# Patient Record
Sex: MALE | Race: WHITE | NOT HISPANIC OR LATINO | Employment: OTHER | URBAN - METROPOLITAN AREA
[De-identification: names, ages, dates, MRNs, and addresses within clinical notes are randomized per-mention and may not be internally consistent; named-entity substitution may affect disease eponyms.]

---

## 2023-04-06 ENCOUNTER — NURSING HOME VISIT (OUTPATIENT)
Dept: POST ACUTE CARE | Facility: EXTERNAL LOCATION | Age: 78
End: 2023-04-06
Payer: MEDICARE

## 2023-04-06 DIAGNOSIS — E11.59 HYPERTENSION ASSOCIATED WITH DIABETES (MULTI): ICD-10-CM

## 2023-04-06 DIAGNOSIS — I63.9 CEREBROVASCULAR ACCIDENT (CVA), UNSPECIFIED MECHANISM (MULTI): ICD-10-CM

## 2023-04-06 DIAGNOSIS — I15.2 HYPERTENSION ASSOCIATED WITH DIABETES (MULTI): ICD-10-CM

## 2023-04-06 DIAGNOSIS — D61.818 PANCYTOPENIA (MULTI): ICD-10-CM

## 2023-04-06 DIAGNOSIS — M86.9 OSTEOMYELITIS, UNSPECIFIED SITE, UNSPECIFIED TYPE (MULTI): Primary | ICD-10-CM

## 2023-04-06 DIAGNOSIS — E11.51 TYPE 2 DIABETES MELLITUS WITH DIABETIC PERIPHERAL ANGIOPATHY WITHOUT GANGRENE, UNSPECIFIED WHETHER LONG TERM INSULIN USE (MULTI): ICD-10-CM

## 2023-04-06 PROCEDURE — 99306 1ST NF CARE HIGH MDM 50: CPT | Performed by: EMERGENCY MEDICINE

## 2023-04-06 PROCEDURE — 99497 ADVNCD CARE PLAN 30 MIN: CPT | Performed by: EMERGENCY MEDICINE

## 2023-04-06 NOTE — LETTER
Patient: Vahid Monahan  : 1945    Encounter Date: 2023    Vahid Monahan   77 y.o.  male  @location@            Assessment and Plan:  History and physical    All Diagnoses This Visit Abnormal CT scan Abnormal CT scan Back pain Bilateral sacroiliitis Carpal tunnel syndrome of left wrist Diabetes mellitus Hypertension Lower back pain Lower back pain Osteomyelitis Stroke Unilateral primary osteoarthritis, left hip   Discharge Medications   New acetaminophen-oxycodone (acetaminophen-oxycodone 325 mg-5 mg oral tablet = Percocet)2 Tabs Oral EVERY SIX HOURS for 7 Days. Refills: 0. calcium carbonate (Tums 500 mg oral tablet, chewable)1 Tabs Oral EVERY FOUR HOURS as needed Indigestion. DAPTOmycin (DAPTOmycin 500 mg intravenous injection)500 Milligram IV EVERY TWENTYFOUR HOURS for 4 Weeks. CBC CMP weekly. Refills: 0. docusate-senna (Senokot S (docu 50mg/senna 8.6mg))1 Tabs Oral TWICE A DAY. gabapentin (gabapentin 100 mg oral capsule)1 Capsules Oral THREE TIMES A DAY. insulin lispro (Humalog)Mild Scale Subcutaneous WITH MEALS AND AT BEDTIME. << Sliding Scale Comments >>  0 Units; 151-200 2 Units; 201-250 4 Units; 251-300 6 Units; 301-350 8 Units; 351-400 10 Units; Greater than 400 12 Units << Sliding Scale Comments >>. magnesium hydroxide (Milk of Magnesia Conc 10ml =30ml MOM)10 Milliliter Oral DAILY as needed Constipation. oxyCODONE (oxyCODONE 5 mg oral tablet (IR))1 Tabs Oral EVERY FOUR HOURS as needed Breakthrough Pain for 7 Days. Refills: 0. piperacillin-tazobactam (Zosyn 3 g-0.375 g/50 mL intravenous solution)3.375 Gram IV EVERY EIGHT HOURS for 4 Weeks. Please call if there is any diarrhea. Refills: 0. polyethylene glycol 3350 (MiraLax)17 Gram Oral DAILY as needed Constipation.   Unchanged aspirin (Margarette Aspirin 325 mg oral tablet)1 Tabs Oral DAILY WITH BREAKFAST. atorvastatin (atorvastatin 80 mg oral tablet)1 Tabs Oral DAILY. chondroitin-glucosamine (Chondroitin-Glucosamine 600 mg-750 mg oral  tablet)1 Tabs Oral TWICE A DAY. glimepiride (glimepiride 2 mg oral tablet)1 Tabs Oral DAILY. metFORMIN (metFORMIN 1000 mg oral tablet)1 Tabs Oral TWICE A DAY WITH MEALS. multivitamin with minerals1 Tabs Oral DAILY. tamsulosin (Flomax 0.4 mg oral capsule)1 Capsules Oral DAILY.   Discontinued ibuprofen = Motrin, Advil (ibuprofen 800 mg oral tablet)1 Tabs Oral EVERY EIGHT HOURS. naproxen (Aleve 220 mg oral capsule)1 Capsules Oral EVERY TWELVE HOURS as needed as needed for pain. Hospital Course     77 year old male was admitted with right sided lumbar pain and was found to have chronic osteomyelitis on imaging. He was placed on IV antibiotics and PICC line was placed for long term IV antibiotics. He was recently evaluated for pancytopenia by hematologist and suspected to have MDS. He will need to follow up with hematologist. He will be discharged to SNF.    I spent greater than 16 minutes discussing advanced care planning including the explanation and discussion of advanced directives. If patient does not have current up to date documents, examples and information provided on how to create both living will and power of . Patient was encouraged to work on completing these documents.  Information and advise was also provided on DO NOT RESUSCITATE and patient encouraged to consider this  Patient is not sure about DNR at this time      Source of history: Nurse, Medical personnel, Medical record, Patient.  History limitation: None.    HPI:  History and physical    Patient is unable to give any detailed history and therefore history is obtained from the chart  No acute complaints voiced by the patient or acute concerns raised by nursing  No current outpatient medications on file.     No current facility-administered medications for this visit.     Chief Complaint Low back pain History of Present Illness 77-year-old male with diabetes, prior CVA, chronic low back pain presents with worsening sharp, stabbing low back  pain that radiates down to his right knee. He states that in January he had mild low back pain however after receiving a pain injection by pain management at the end of the month, he developed significant worsening of the low back pain. It has since progressed to the point where he is now unable to ambulate. He is in no pain while laying flat but gets discomfort once he flexes his torso forward. He denies any numbness, tingling, saddle anesthesia, bowel or bladder incontinence.    He quit smoking 50 years ago. He denies any alcohol or illicit drug use. He has been seeing hematology for pancytopenia for which there is no clear cause at this time.    He does admit to a large weight loss since having a CVA in February 2022.    Problem List/Past Medical History Ongoing Arthritis Bilateral sacroiliitis Carpal tunnel syndrome of left wrist CHF - Congestive heart failure Degenerative disc disease, lumbar Diabetes mellitus Greater trochanteric bursitis of right hip Hernia Hyperlipidemia Hypertension Long term prescription opiate use Lumbar postlaminectomy syndrome Lumbar stenosis Lumbosacral spondylosis without myelopathy Pancytopenia Radicular low back pain Stroke Unilateral primary osteoarthritis, left hip Weight loss of more than 10% body weight Historical Low back pain Osteoarthritis of left hip Umbilical hernia Procedure/Surgical History Sacroiliac Joint Injection.: 01/04/23  Lumbar Medial Nerve Branch Radiofrequency Ablation (RFA).: 04/09/19  Lumbar Medial Nerve Branch Radiofrequency Ablation (RFA).: 04/02/19  bilateral lumbar medial nerve branch block: 02/05/19  Lumbar Medial Nerve Branch Block.: 01/22/19  Carpal Tunnel Release.: 01/17/18  Lumbar Laminectomy.: 09/25/17  total left hip: 06/27/16  Umbilical hernia repair Medications Inpatient acetaminophen-oxycodone 325 mg-5 mg oral tablet = Percocet, 1 tabs, ORAL, [id number], PRN Ambien, 2.5 mg= 1 EA, ORAL, [id number], PRN aspirin, 325 mg= 1 tabs, ORAL, DAILY  WITH BREAKFAST atorvastatin, 80 mg= 2 tabs, ORAL, DAILY Dextrose 50% injection, 12.5 g= 25 mL, IV Push, PRN, PRN Dextrose 50% injection, 25 g= 50 mL, IV Push, PRN, PRN Flomax, 0.4 mg= 1 caps, ORAL, DAILY gabapentin, 100 mg= 1 caps, ORAL, TID glucagon, 1 mg, IM, PRN, PRN glucose 15 g/42 mL oral gel, 15 g= 1 packets, ORAL, PRN, PRN glucose 15 g/42 mL oral gel, 30 g= 2 packets, ORAL, PRN, PRN Humalog coverage, Mild Scale, Subcutaneous, QIDWMHS ketorolac = Toradol, 15 mg= 1 mL, IV Push, [id number] Lovenox, 40 mg= 0.4 mL, Subcutaneous, QHS Theragran-M (multivitamin), 1 tabs, ORAL, DAILY Tylenol, 650 mg= 2 tabs, ORAL, [id number], PRN Home Aleve 220 mg oral capsule, 220 mg= 1 caps, ORAL, [id number], PRN atorvastatin 80 mg oral tablet, 80 mg= 1 tabs, ORAL, DAILY Margarette Aspirin 325 mg oral tablet, 325 mg= 1 tabs, ORAL, DAILY WITH BREAKFAST Chondroitin-Glucosamine 600 mg-750 mg oral tablet, 1 tabs, ORAL, BID Flomax 0.4 mg oral capsule, 0.4 mg= 1 caps, ORAL, DAILY glimepiride 2 mg oral tablet, 2 mg= 1 tabs, ORAL, DAILY ibuprofen 800 mg oral tablet, 800 mg= 1 tabs, ORAL, [id number] metFORMIN 1000 mg oral tablet, 1000 mg= 1 tabs, ORAL, BIDWM multivitamin with minerals, 1 tabs, ORAL, DAILY Allergies No Known Allergies No Known Medication Allergies Social History   Alcohol - Denies Alcohol Use   Domestic Concerns   No, Pt's responsibilities: Driving, Housework, Yard work. Hospital finance concerns: No. No, Family/Friends available to help: Yes.   Home/Environment   Lives with Alone. Living situation: Home/Independent. Home equipment: Walker/Cane. Home monitoring equipment: None. Special/Community resources: None. Mobility prior to admit: Independent. Home Barriers: Internal Stairs. Will patient require additional/new services upon discharge? Yes.   Nutrition/Health   Regular, Diabetic, Unintentional weight change: No.   Substance Abuse - Denies Substance Abuse   Tobacco   Former smoker Family History Asbestosis....: Father. DVT (deep venous  thrombosis)....: Son. High blood pressure....: Mother and Father. Hyperlipidemia: Son. Lung cancer..: Mother. Pneumonia: Father.      No visits with results within 2 Month(s) from this visit.   Latest known visit with results is:   No results found for any previous visit.       Physical Exam:  Vital signs as per nursing/MA documentation were reviewed  General appearance: Alert and in no acute distress  HEENT: Normal Inspection  Neck - Normal Inspection  Respiratory : No respiratory distress. Lungs are clear   Cardiovascular: heart rate normal. No gallop  Back - normal inspection  Skin inspection:Warm  Musculoskeletal : No deformities  Neuro : Limited exam. Baseline    ROS: Review of symptoms is negative except for what is mentioned in HPI    No results found.    No family history on file.    Social History     Socioeconomic History   • Marital status:      Spouse name: Not on file   • Number of children: Not on file   • Years of education: Not on file   • Highest education level: Not on file   Occupational History   • Not on file   Tobacco Use   • Smoking status: Not on file   • Smokeless tobacco: Not on file   Substance and Sexual Activity   • Alcohol use: Not on file   • Drug use: Not on file   • Sexual activity: Not on file   Other Topics Concern   • Not on file   Social History Narrative   • Not on file     Social Determinants of Health     Financial Resource Strain: Not on file   Food Insecurity: Not on file   Transportation Needs: Not on file   Physical Activity: Not on file   Stress: Not on file   Social Connections: Not on file   Intimate Partner Violence: Not on file   Housing Stability: Not on file       No past medical history on file.    No past surgical history on file.      Charting was completed using voice recognition technology and may include unintended errors.    Discussed with patient/family, RN, and NP.      Electronically Signed By: Diogo Hopper MD   4/6/23  2:15 PM

## 2023-04-06 NOTE — PROGRESS NOTES
Vahid Monahan   77 y.o.  male  @location@            Assessment and Plan:  History and physical    All Diagnoses This Visit Abnormal CT scan Abnormal CT scan Back pain Bilateral sacroiliitis Carpal tunnel syndrome of left wrist Diabetes mellitus Hypertension Lower back pain Lower back pain Osteomyelitis Stroke Unilateral primary osteoarthritis, left hip   Discharge Medications   New acetaminophen-oxycodone (acetaminophen-oxycodone 325 mg-5 mg oral tablet = Percocet)2 Tabs Oral EVERY SIX HOURS for 7 Days. Refills: 0. calcium carbonate (Tums 500 mg oral tablet, chewable)1 Tabs Oral EVERY FOUR HOURS as needed Indigestion. DAPTOmycin (DAPTOmycin 500 mg intravenous injection)500 Milligram IV EVERY TWENTYFOUR HOURS for 4 Weeks. CBC CMP weekly. Refills: 0. docusate-senna (Senokot S (docu 50mg/senna 8.6mg))1 Tabs Oral TWICE A DAY. gabapentin (gabapentin 100 mg oral capsule)1 Capsules Oral THREE TIMES A DAY. insulin lispro (Humalog)Mild Scale Subcutaneous WITH MEALS AND AT BEDTIME. << Sliding Scale Comments >>  0 Units; 151-200 2 Units; 201-250 4 Units; 251-300 6 Units; 301-350 8 Units; 351-400 10 Units; Greater than 400 12 Units << Sliding Scale Comments >>. magnesium hydroxide (Milk of Magnesia Conc 10ml =30ml MOM)10 Milliliter Oral DAILY as needed Constipation. oxyCODONE (oxyCODONE 5 mg oral tablet (IR))1 Tabs Oral EVERY FOUR HOURS as needed Breakthrough Pain for 7 Days. Refills: 0. piperacillin-tazobactam (Zosyn 3 g-0.375 g/50 mL intravenous solution)3.375 Gram IV EVERY EIGHT HOURS for 4 Weeks. Please call if there is any diarrhea. Refills: 0. polyethylene glycol 3350 (MiraLax)17 Gram Oral DAILY as needed Constipation.   Unchanged aspirin (Margarette Aspirin 325 mg oral tablet)1 Tabs Oral DAILY WITH BREAKFAST. atorvastatin (atorvastatin 80 mg oral tablet)1 Tabs Oral DAILY. chondroitin-glucosamine (Chondroitin-Glucosamine 600 mg-750 mg oral tablet)1 Tabs Oral TWICE A DAY. glimepiride (glimepiride 2 mg oral tablet)1 Tabs  Oral DAILY. metFORMIN (metFORMIN 1000 mg oral tablet)1 Tabs Oral TWICE A DAY WITH MEALS. multivitamin with minerals1 Tabs Oral DAILY. tamsulosin (Flomax 0.4 mg oral capsule)1 Capsules Oral DAILY.   Discontinued ibuprofen = Motrin, Advil (ibuprofen 800 mg oral tablet)1 Tabs Oral EVERY EIGHT HOURS. naproxen (Aleve 220 mg oral capsule)1 Capsules Oral EVERY TWELVE HOURS as needed as needed for pain. Hospital Course     77 year old male was admitted with right sided lumbar pain and was found to have chronic osteomyelitis on imaging. He was placed on IV antibiotics and PICC line was placed for long term IV antibiotics. He was recently evaluated for pancytopenia by hematologist and suspected to have MDS. He will need to follow up with hematologist. He will be discharged to SNF.    I spent greater than 16 minutes discussing advanced care planning including the explanation and discussion of advanced directives. If patient does not have current up to date documents, examples and information provided on how to create both living will and power of . Patient was encouraged to work on completing these documents.  Information and advise was also provided on DO NOT RESUSCITATE and patient encouraged to consider this  Patient is not sure about DNR at this time      Source of history: Nurse, Medical personnel, Medical record, Patient.  History limitation: None.    HPI:  History and physical    Patient is unable to give any detailed history and therefore history is obtained from the chart  No acute complaints voiced by the patient or acute concerns raised by nursing  No current outpatient medications on file.     No current facility-administered medications for this visit.     Chief Complaint Low back pain History of Present Illness 77-year-old male with diabetes, prior CVA, chronic low back pain presents with worsening sharp, stabbing low back pain that radiates down to his right knee. He states that in January he had mild  low back pain however after receiving a pain injection by pain management at the end of the month, he developed significant worsening of the low back pain. It has since progressed to the point where he is now unable to ambulate. He is in no pain while laying flat but gets discomfort once he flexes his torso forward. He denies any numbness, tingling, saddle anesthesia, bowel or bladder incontinence.    He quit smoking 50 years ago. He denies any alcohol or illicit drug use. He has been seeing hematology for pancytopenia for which there is no clear cause at this time.    He does admit to a large weight loss since having a CVA in February 2022.    Problem List/Past Medical History Ongoing Arthritis Bilateral sacroiliitis Carpal tunnel syndrome of left wrist CHF - Congestive heart failure Degenerative disc disease, lumbar Diabetes mellitus Greater trochanteric bursitis of right hip Hernia Hyperlipidemia Hypertension Long term prescription opiate use Lumbar postlaminectomy syndrome Lumbar stenosis Lumbosacral spondylosis without myelopathy Pancytopenia Radicular low back pain Stroke Unilateral primary osteoarthritis, left hip Weight loss of more than 10% body weight Historical Low back pain Osteoarthritis of left hip Umbilical hernia Procedure/Surgical History Sacroiliac Joint Injection.: 01/04/23  Lumbar Medial Nerve Branch Radiofrequency Ablation (RFA).: 04/09/19  Lumbar Medial Nerve Branch Radiofrequency Ablation (RFA).: 04/02/19  bilateral lumbar medial nerve branch block: 02/05/19  Lumbar Medial Nerve Branch Block.: 01/22/19  Carpal Tunnel Release.: 01/17/18  Lumbar Laminectomy.: 09/25/17  total left hip: 06/27/16  Umbilical hernia repair Medications Inpatient acetaminophen-oxycodone 325 mg-5 mg oral tablet = Percocet, 1 tabs, ORAL, [id number], PRN Ambien, 2.5 mg= 1 EA, ORAL, [id number], PRN aspirin, 325 mg= 1 tabs, ORAL, DAILY WITH BREAKFAST atorvastatin, 80 mg= 2 tabs, ORAL, DAILY Dextrose 50% injection,  12.5 g= 25 mL, IV Push, PRN, PRN Dextrose 50% injection, 25 g= 50 mL, IV Push, PRN, PRN Flomax, 0.4 mg= 1 caps, ORAL, DAILY gabapentin, 100 mg= 1 caps, ORAL, TID glucagon, 1 mg, IM, PRN, PRN glucose 15 g/42 mL oral gel, 15 g= 1 packets, ORAL, PRN, PRN glucose 15 g/42 mL oral gel, 30 g= 2 packets, ORAL, PRN, PRN Humalog coverage, Mild Scale, Subcutaneous, QIDWMHS ketorolac = Toradol, 15 mg= 1 mL, IV Push, [id number] Lovenox, 40 mg= 0.4 mL, Subcutaneous, QHS Theragran-M (multivitamin), 1 tabs, ORAL, DAILY Tylenol, 650 mg= 2 tabs, ORAL, [id number], PRN Home Aleve 220 mg oral capsule, 220 mg= 1 caps, ORAL, [id number], PRN atorvastatin 80 mg oral tablet, 80 mg= 1 tabs, ORAL, DAILY Margarette Aspirin 325 mg oral tablet, 325 mg= 1 tabs, ORAL, DAILY WITH BREAKFAST Chondroitin-Glucosamine 600 mg-750 mg oral tablet, 1 tabs, ORAL, BID Flomax 0.4 mg oral capsule, 0.4 mg= 1 caps, ORAL, DAILY glimepiride 2 mg oral tablet, 2 mg= 1 tabs, ORAL, DAILY ibuprofen 800 mg oral tablet, 800 mg= 1 tabs, ORAL, [id number] metFORMIN 1000 mg oral tablet, 1000 mg= 1 tabs, ORAL, BIDWM multivitamin with minerals, 1 tabs, ORAL, DAILY Allergies No Known Allergies No Known Medication Allergies Social History   Alcohol - Denies Alcohol Use   Domestic Concerns   No, Pt's responsibilities: Driving, Housework, Yard work. Hospital finance concerns: No. No, Family/Friends available to help: Yes.   Home/Environment   Lives with Alone. Living situation: Home/Independent. Home equipment: Walker/Cane. Home monitoring equipment: None. Special/Community resources: None. Mobility prior to admit: Independent. Home Barriers: Internal Stairs. Will patient require additional/new services upon discharge? Yes.   Nutrition/Health   Regular, Diabetic, Unintentional weight change: No.   Substance Abuse - Denies Substance Abuse   Tobacco   Former smoker Family History Asbestosis....: Father. DVT (deep venous thrombosis)....: Son. High blood pressure....: Mother and Father. Hyperlipidemia:  Son. Lung cancer..: Mother. Pneumonia: Father.      No visits with results within 2 Month(s) from this visit.   Latest known visit with results is:   No results found for any previous visit.       Physical Exam:  Vital signs as per nursing/MA documentation were reviewed  General appearance: Alert and in no acute distress  HEENT: Normal Inspection  Neck - Normal Inspection  Respiratory : No respiratory distress. Lungs are clear   Cardiovascular: heart rate normal. No gallop  Back - normal inspection  Skin inspection:Warm  Musculoskeletal : No deformities  Neuro : Limited exam. Baseline    ROS: Review of symptoms is negative except for what is mentioned in HPI    No results found.    No family history on file.    Social History     Socioeconomic History    Marital status:      Spouse name: Not on file    Number of children: Not on file    Years of education: Not on file    Highest education level: Not on file   Occupational History    Not on file   Tobacco Use    Smoking status: Not on file    Smokeless tobacco: Not on file   Substance and Sexual Activity    Alcohol use: Not on file    Drug use: Not on file    Sexual activity: Not on file   Other Topics Concern    Not on file   Social History Narrative    Not on file     Social Determinants of Health     Financial Resource Strain: Not on file   Food Insecurity: Not on file   Transportation Needs: Not on file   Physical Activity: Not on file   Stress: Not on file   Social Connections: Not on file   Intimate Partner Violence: Not on file   Housing Stability: Not on file       No past medical history on file.    No past surgical history on file.      Charting was completed using voice recognition technology and may include unintended errors.    Discussed with patient/family, RN, and NP.

## 2023-04-15 ENCOUNTER — NURSING HOME VISIT (OUTPATIENT)
Dept: POST ACUTE CARE | Facility: EXTERNAL LOCATION | Age: 78
End: 2023-04-15
Payer: MEDICARE

## 2023-04-15 DIAGNOSIS — E11.59 HYPERTENSION ASSOCIATED WITH DIABETES (MULTI): ICD-10-CM

## 2023-04-15 DIAGNOSIS — I63.81 CEREBROVASCULAR ACCIDENT (CVA) DUE TO OCCLUSION OF SMALL ARTERY (MULTI): ICD-10-CM

## 2023-04-15 DIAGNOSIS — D61.818 PANCYTOPENIA (MULTI): ICD-10-CM

## 2023-04-15 DIAGNOSIS — M46.47 DISCITIS OF LUMBOSACRAL REGION: ICD-10-CM

## 2023-04-15 DIAGNOSIS — I15.2 HYPERTENSION ASSOCIATED WITH DIABETES (MULTI): ICD-10-CM

## 2023-04-15 DIAGNOSIS — M46.20: Primary | ICD-10-CM

## 2023-04-15 DIAGNOSIS — E11.51 TYPE 2 DIABETES MELLITUS WITH DIABETIC PERIPHERAL ANGIOPATHY WITHOUT GANGRENE, WITHOUT LONG-TERM CURRENT USE OF INSULIN (MULTI): ICD-10-CM

## 2023-04-15 DIAGNOSIS — M86.20: ICD-10-CM

## 2023-04-15 PROCEDURE — 99306 1ST NF CARE HIGH MDM 50: CPT | Performed by: INTERNAL MEDICINE

## 2023-04-15 NOTE — LETTER
Patient: Vahid Monahan  : 1945    Encounter Date: 04/15/2023    Patient ID: Vahid Monahan is a 77 y.o. male who presents for   Skilled services for therapy, nursing observation and care plan implementation. S/P hosp stay for back, hip pain, past medical hx., OA Lt.hip, Umbilical hernia, atroke, HTN, DM. Monitor for pain, ADL level of participation. Monitor lab work and VS as indicated.   Assessment/Plan    Problem List Items Addressed This Visit          Nervous    Cerebrovascular accident (CVA) (CMS/[facility])       Circulatory    Type 2 diabetes mellitus with diabetic peripheral angiopathy without gangrene (CMS/[facility])     Diabetes is chronic disease, it does not get cured but it can be controlled, in modern medicine there are variety of measures taken to control DM. Usually we want to preserve beta cell functions. Please understand the disease and how our life style can affect control of glycemia. Diabetes leads to macro and microvascular complications and they could be devastating. It is important to have annual eye check and frequent foot inspection and foot inspection. Kidney dysfunction including dialysis, foot amputations, neuropathy, foot ulcers and accelerated atherosclerotic vascular disease are known complications of uncontrolled DM, pt was educated and explained.           Hypertension associated with diabetes (CMS/[facility])     Patients BP readings reviewed and addressed, as we age our arteries turn stiffer and less elastic. Restricting salt consumption and staying physically fit with regular exercise regimen is the only way to keep our vasculature less tonic. Studies have shown that keeping ideal body wt, exercise routine about 140 to 150 minutes a week, eating variety of plant based diet and drinking plentiful water are quite helpful. Monitor BP twice or once a week at home and bring log to be reviewed by me. Uncontrolled BP has long term consequences including heart failure, myocardial infarction,  accelerated atherosclerosis and kidney dysfunction. Therapy reviewed and explained.              Musculoskeletal    Osteomyelitis (CMS/HCC)     Repeat CBC differential sed rate         Discitis of lumbosacral region     PT OT pain medication IV antibiotic follow-up with the infectious disease oncologist and spine specialist         Acute osteomyelitis of spine (CMS/HCC) - Primary     Continue fluconazole daptomycin probiotics physical therapy Occupational Therapy check CBC BMP sed rate follow-up with infectious disease spine specialist and neurosurgery oncologist            Hematologic    Pancytopenia (CMS/HCC)     Continue iron B12 folic acid him at oncology follow-up          Pt was seen as a initial visit at skilled care facility after recent hospitalization, pt is feeling weak and in the process of convalescence. Hospitalization data, imaging studies, labs and consultations and DC summary reviewed. Assessed by physical therapy during stay and considered for short term skilled stay. Initial skin assessment reviewed through nursing staff. Catheter or any lines also has been checked and inquired though nursing staff. usually convalescence process is prolonged and pt feels weak and tired. AEMR if applies reviewed. Problem list reviewed and compiled. Discussed with nursing staff.   Source of history: Nurse, Medical personnel, Medical record, Patient.  History limitation: None.      HPI  This is a 77-year-old patient with intractable pain history of the weight loss back pain hip pain more on the right compared to left when for pain management seen by pain management PCP oncologist CT-guided biopsy difficulty to manage pain as there is a pancytopenia infection inflammation malignancy cannot be rule out given epidural block Percocet Dilaudid continue decreased ADL decreased mobility without involvement of the bladder or bowels for physical therapy occupational therapy and pain medication    Past medical history of  chronic back pain osteoarthritis hip umbilical hernia    Father have high blood pressure pneumonia    Mother had lung cancer    Son have hypertension    Social history ex-smoker nondrinker    Review hospital record diagnosis of unilateral primary osteoarthritis stroke back pain hypertension diabetes carpal tunnel bilateral sacroiliitis pancytopenia destructive bone lesion L3-L4 endplate significant weight loss other comorbid condition sacroiliitis carpal tunnel CHF diabetes hernia pancytopenia spondylolisthesis radiculopathy back pain TIA weight loss    Procedure epidural block CT-guided biopsy O surgery laminectomy hip surgery umbilical hernia repair  Not on File    Medications  Order Order Status Revised Last Ordered    Calcium Carbonate Oral Tablet Chewable 500 MG (Calcium Carbonate (Antacid)) Active 4/13/2023   This order is potentially duplicated by other orders on the chart. Click to view details.    oxyCODONE HCl Oral Tablet 5 MG (Oxycodone HCl) Active 4/13/2023 4/13/2023    Tuberculin PPD Solution Active 4/14/2023     Aspirin Oral Tablet 325 MG (Aspirin) Active 4/14/2023     metFORMIN HCl Oral Tablet 1000 MG (Metformin HCl) Active 4/14/2023 4/13/2023  This order is potentially duplicated by other orders on the chart. Click to view details.    oxyCODONE-Acetaminophen Oral Tablet 5-325 MG (Oxycodone w/ Acetaminophen) Active 4/13/2023 4/13/2023    Multivitamin-Minerals Oral Tablet (Multiple Vitamins w/ Minerals) Active 4/14/2023     Glucosamine-Chondroitin Oral Tablet Chewable 750-600 MG (Glucosamine-Chondroitin) Active 4/14/2023 4/13/2023    Rosuvastatin Calcium Oral Tablet 5 MG (Rosuvastatin Calcium) Active 4/14/2023 4/13/2023    Omeprazole 20 MG Capsule delayed release Active 4/14/2023 4/14/2023    Fluconazole Oral Tablet 100 MG (Fluconazole) Active 4/14/2023     DAPTOmycin Intravenous Solution Reconstituted 500 MG (Daptomycin) Active 4/15/2023 4/14/2023    Gabapentin Oral Capsule 100 MG  (Gabapentin) Active 4/14/2023 4/14/2023    Normal Saline Flush Active 4/15/2023 4/15/2023        Objective    Current Vitals   BP: 134/70 mmHg  4/15/2023 03:57    Temp:97.4 °F  4/15/2023 03:57  Pulse:92 bpm  4/15/2023 03:57  Weight:117.7 Lbs  4/13/2023 22:39  Resp:18 Breaths/min  4/15/2023 03:57  BS:106 mg/dL  4/15/2023 05:49  O2:97 %  4/15/2023 03:57  Pain:0  4/15/2023 03:57  Visit Vitals  Smoking Status Former       PHYSICAL EXAM  General: NAD. NCAT. Aox3   HEENT: PERRLA. EOMI. MMM. Nares patent bl.  Cardiovascular: Heart murmur  Respiratory: Crackles rales rhonchi GI: Soft, NT abdomen. BS present x 4.   : No CVAT BL  MSK: Arthritis tenderness of the lumbosacral spine and both hip  Extremities: No edema. Cap refill < 2 sec.   Skin: Sacral pressure ulcer stage II neuro: Lumbar cervical radiculopathy psych: Mood wnl.      Physical Exam  Cardiovascular:      Pulses:           Dorsalis pedis pulses are 2+ on the right side and 2+ on the left side.        Posterior tibial pulses are 2+ on the right side and 2+ on the left side.   Musculoskeletal:      Right foot: No deformity.      Left foot: No deformity.   Feet:      Right foot:      Protective Sensation: 5 sites tested.        Skin integrity: Skin integrity normal.      Toenail Condition: Right toenails are normal.      Left foot:      Protective Sensation: 5 sites tested.        Skin integrity: Skin integrity normal.      Toenail Condition: Left toenails are normal.         ROS  Constitutional: Denies fevers, chills, fatigue, weight loss/gain  HEENT: Denies HA, vision changes, hearing loss, sore throat  Cardiac: Denies CP, palpitations, edema  Respiratory: Denies SOB, cough, pleuritic chest pain, PND, orthopnea  GI: Denies N/V/D, abd pain, constipation, black/bloody stools  : Denies urinary changes, frequency, hematuria, urgency, retention, flank pain  MSK: Denies joint pain, joint swelling, back pain, neck pain, extremity pain  Neuro: Denies numbness,  weakness, tingling          Radiology: Reviewed imaging in powerchart.  No results found.    No family history on file.  Social History     Socioeconomic History   • Marital status:      Spouse name: None   • Number of children: None   • Years of education: None   • Highest education level: None   Occupational History   • None   Tobacco Use   • Smoking status: Former     Types: Cigarettes   • Smokeless tobacco: Never   Vaping Use   • Vaping status: None   Substance and Sexual Activity   • Alcohol use: Not Currently   • Drug use: Not Currently   • Sexual activity: None   Other Topics Concern   • None   Social History Narrative   • None     Social Determinants of Health     Financial Resource Strain: Not on file   Food Insecurity: Not on file   Transportation Needs: Not on file   Physical Activity: Not on file   Stress: Not on file   Social Connections: Not on file   Intimate Partner Violence: Not on file   Housing Stability: Not on file     History reviewed. No pertinent past medical history.  History reviewed. No pertinent surgical history.  * Cannot find OR log *    Charting was completed using voice recognition technology and may include unintended errors.           Electronically Signed By: Corey Atkinson MD   4/15/23  3:23 PM

## 2023-04-15 NOTE — ASSESSMENT & PLAN NOTE
PT OT pain medication IV antibiotic follow-up with the infectious disease oncologist and spine specialist

## 2023-04-15 NOTE — PROGRESS NOTES
Patient ID: Vahid Monahan is a 77 y.o. male who presents for   Skilled services for therapy, nursing observation and care plan implementation. S/P hosp stay for back, hip pain, past medical hx., OA Lt.hip, Umbilical hernia, atroke, HTN, DM. Monitor for pain, ADL level of participation. Monitor lab work and VS as indicated.   Assessment/Plan     Problem List Items Addressed This Visit          Nervous    Cerebrovascular accident (CVA) (CMS/[facility])       Circulatory    Type 2 diabetes mellitus with diabetic peripheral angiopathy without gangrene (CMS/[facility])     Diabetes is chronic disease, it does not get cured but it can be controlled, in modern medicine there are variety of measures taken to control DM. Usually we want to preserve beta cell functions. Please understand the disease and how our life style can affect control of glycemia. Diabetes leads to macro and microvascular complications and they could be devastating. It is important to have annual eye check and frequent foot inspection and foot inspection. Kidney dysfunction including dialysis, foot amputations, neuropathy, foot ulcers and accelerated atherosclerotic vascular disease are known complications of uncontrolled DM, pt was educated and explained.           Hypertension associated with diabetes (CMS/[facility])     Patients BP readings reviewed and addressed, as we age our arteries turn stiffer and less elastic. Restricting salt consumption and staying physically fit with regular exercise regimen is the only way to keep our vasculature less tonic. Studies have shown that keeping ideal body wt, exercise routine about 140 to 150 minutes a week, eating variety of plant based diet and drinking plentiful water are quite helpful. Monitor BP twice or once a week at home and bring log to be reviewed by me. Uncontrolled BP has long term consequences including heart failure, myocardial infarction, accelerated atherosclerosis and kidney dysfunction. Therapy reviewed and  explained.              Musculoskeletal    Osteomyelitis (CMS/HCC)     Repeat CBC differential sed rate         Discitis of lumbosacral region     PT OT pain medication IV antibiotic follow-up with the infectious disease oncologist and spine specialist         Acute osteomyelitis of spine (CMS/HCC) - Primary     Continue fluconazole daptomycin probiotics physical therapy Occupational Therapy check CBC BMP sed rate follow-up with infectious disease spine specialist and neurosurgery oncologist            Hematologic    Pancytopenia (CMS/HCC)     Continue iron B12 folic acid him at oncology follow-up          Pt was seen as a initial visit at skilled care facility after recent hospitalization, pt is feeling weak and in the process of convalescence. Hospitalization data, imaging studies, labs and consultations and DC summary reviewed. Assessed by physical therapy during stay and considered for short term skilled stay. Initial skin assessment reviewed through nursing staff. Catheter or any lines also has been checked and inquired though nursing staff. usually convalescence process is prolonged and pt feels weak and tired. AEMR if applies reviewed. Problem list reviewed and compiled. Discussed with nursing staff.   Source of history: Nurse, Medical personnel, Medical record, Patient.  History limitation: None.      HPI  This is a 77-year-old patient with intractable pain history of the weight loss back pain hip pain more on the right compared to left when for pain management seen by pain management PCP oncologist CT-guided biopsy difficulty to manage pain as there is a pancytopenia infection inflammation malignancy cannot be rule out given epidural block Percocet Dilaudid continue decreased ADL decreased mobility without involvement of the bladder or bowels for physical therapy occupational therapy and pain medication    Past medical history of chronic back pain osteoarthritis hip umbilical hernia    Father have high  blood pressure pneumonia    Mother had lung cancer    Son have hypertension    Social history ex-smoker nondrinker    Review hospital record diagnosis of unilateral primary osteoarthritis stroke back pain hypertension diabetes carpal tunnel bilateral sacroiliitis pancytopenia destructive bone lesion L3-L4 endplate significant weight loss other comorbid condition sacroiliitis carpal tunnel CHF diabetes hernia pancytopenia spondylolisthesis radiculopathy back pain TIA weight loss    Procedure epidural block CT-guided biopsy O surgery laminectomy hip surgery umbilical hernia repair  Not on File    Medications  Order Order Status Revised Last Ordered    Calcium Carbonate Oral Tablet Chewable 500 MG (Calcium Carbonate (Antacid)) Active 4/13/2023   This order is potentially duplicated by other orders on the chart. Click to view details.    oxyCODONE HCl Oral Tablet 5 MG (Oxycodone HCl) Active 4/13/2023 4/13/2023    Tuberculin PPD Solution Active 4/14/2023     Aspirin Oral Tablet 325 MG (Aspirin) Active 4/14/2023     metFORMIN HCl Oral Tablet 1000 MG (Metformin HCl) Active 4/14/2023 4/13/2023  This order is potentially duplicated by other orders on the chart. Click to view details.    oxyCODONE-Acetaminophen Oral Tablet 5-325 MG (Oxycodone w/ Acetaminophen) Active 4/13/2023 4/13/2023    Multivitamin-Minerals Oral Tablet (Multiple Vitamins w/ Minerals) Active 4/14/2023     Glucosamine-Chondroitin Oral Tablet Chewable 750-600 MG (Glucosamine-Chondroitin) Active 4/14/2023 4/13/2023    Rosuvastatin Calcium Oral Tablet 5 MG (Rosuvastatin Calcium) Active 4/14/2023 4/13/2023    Omeprazole 20 MG Capsule delayed release Active 4/14/2023 4/14/2023    Fluconazole Oral Tablet 100 MG (Fluconazole) Active 4/14/2023     DAPTOmycin Intravenous Solution Reconstituted 500 MG (Daptomycin) Active 4/15/2023 4/14/2023    Gabapentin Oral Capsule 100 MG (Gabapentin) Active 4/14/2023 4/14/2023    Normal Saline  Flush Active 4/15/2023 4/15/2023        Objective     Current Vitals   BP: 134/70 mmHg  4/15/2023 03:57    Temp:97.4 °F  4/15/2023 03:57  Pulse:92 bpm  4/15/2023 03:57  Weight:117.7 Lbs  4/13/2023 22:39  Resp:18 Breaths/min  4/15/2023 03:57  BS:106 mg/dL  4/15/2023 05:49  O2:97 %  4/15/2023 03:57  Pain:0  4/15/2023 03:57  Visit Vitals  Smoking Status Former       PHYSICAL EXAM  General: NAD. NCAT. Aox3   HEENT: PERRLA. EOMI. MMM. Nares patent bl.  Cardiovascular: Heart murmur  Respiratory: Crackles rales rhonchi GI: Soft, NT abdomen. BS present x 4.   : No CVAT BL  MSK: Arthritis tenderness of the lumbosacral spine and both hip  Extremities: No edema. Cap refill < 2 sec.   Skin: Sacral pressure ulcer stage II neuro: Lumbar cervical radiculopathy psych: Mood wnl.      Physical Exam  Cardiovascular:      Pulses:           Dorsalis pedis pulses are 2+ on the right side and 2+ on the left side.        Posterior tibial pulses are 2+ on the right side and 2+ on the left side.   Musculoskeletal:      Right foot: No deformity.      Left foot: No deformity.   Feet:      Right foot:      Protective Sensation: 5 sites tested.        Skin integrity: Skin integrity normal.      Toenail Condition: Right toenails are normal.      Left foot:      Protective Sensation: 5 sites tested.        Skin integrity: Skin integrity normal.      Toenail Condition: Left toenails are normal.         ROS  Constitutional: Denies fevers, chills, fatigue, weight loss/gain  HEENT: Denies HA, vision changes, hearing loss, sore throat  Cardiac: Denies CP, palpitations, edema  Respiratory: Denies SOB, cough, pleuritic chest pain, PND, orthopnea  GI: Denies N/V/D, abd pain, constipation, black/bloody stools  : Denies urinary changes, frequency, hematuria, urgency, retention, flank pain  MSK: Denies joint pain, joint swelling, back pain, neck pain, extremity pain  Neuro: Denies numbness, weakness, tingling          Radiology: Reviewed imaging in  powerchart.  No results found.    No family history on file.  Social History     Socioeconomic History    Marital status:      Spouse name: None    Number of children: None    Years of education: None    Highest education level: None   Occupational History    None   Tobacco Use    Smoking status: Former     Types: Cigarettes    Smokeless tobacco: Never   Vaping Use    Vaping status: None   Substance and Sexual Activity    Alcohol use: Not Currently    Drug use: Not Currently    Sexual activity: None   Other Topics Concern    None   Social History Narrative    None     Social Determinants of Health     Financial Resource Strain: Not on file   Food Insecurity: Not on file   Transportation Needs: Not on file   Physical Activity: Not on file   Stress: Not on file   Social Connections: Not on file   Intimate Partner Violence: Not on file   Housing Stability: Not on file     History reviewed. No pertinent past medical history.  History reviewed. No pertinent surgical history.  * Cannot find OR log *    Charting was completed using voice recognition technology and may include unintended errors.

## 2023-04-15 NOTE — ASSESSMENT & PLAN NOTE
Continue fluconazole daptomycin probiotics physical therapy Occupational Therapy check CBC BMP sed rate follow-up with infectious disease spine specialist and neurosurgery oncologist

## 2023-05-07 ENCOUNTER — NURSING HOME VISIT (OUTPATIENT)
Dept: POST ACUTE CARE | Facility: EXTERNAL LOCATION | Age: 78
End: 2023-05-07
Payer: MEDICARE

## 2023-05-07 DIAGNOSIS — J18.9 PNEUMONIA OF RIGHT MIDDLE LOBE DUE TO INFECTIOUS ORGANISM: Primary | ICD-10-CM

## 2023-05-07 DIAGNOSIS — K21.9 GERD WITHOUT ESOPHAGITIS: ICD-10-CM

## 2023-05-07 DIAGNOSIS — E11.51 TYPE 2 DIABETES MELLITUS WITH DIABETIC PERIPHERAL ANGIOPATHY WITHOUT GANGRENE, WITHOUT LONG-TERM CURRENT USE OF INSULIN (MULTI): ICD-10-CM

## 2023-05-07 DIAGNOSIS — D61.818 PANCYTOPENIA (MULTI): ICD-10-CM

## 2023-05-07 DIAGNOSIS — I63.81 CEREBROVASCULAR ACCIDENT (CVA) DUE TO OCCLUSION OF SMALL ARTERY (MULTI): ICD-10-CM

## 2023-05-07 DIAGNOSIS — M46.20: ICD-10-CM

## 2023-05-07 PROCEDURE — 99309 SBSQ NF CARE MODERATE MDM 30: CPT | Performed by: INTERNAL MEDICINE

## 2023-05-07 NOTE — LETTER
Patient: Vahid Monahan  : 1945    Encounter Date: 2023    Patient ID: Vahid Monahan is a 77 y.o. male who presents for   Skilled services for therapy, nursing observation and care plan implementation. S/P hosp stay for back, hip pain, past medical hx., OA Lt.hip, Umbilical hernia, atroke, HTN, DM. Monitor for pain, ADL level of participation. Monitor lab work and VS as indicated.   77-year-old patient complains of cough congestion shortness of breath diagnosis of right lobe pneumonia given antibiotic probiotic chest x-ray CBC with differential albuterol as a treatment comorbid condition CVA hypertension hyperlipidemia diabetes failure to thrive severe protein calorie malnutrition pressure ulcer sacrum  Assessment/Plan    Problem List Items Addressed This Visit          Nervous    Cerebrovascular accident (CVA) (CMS/HCC)     PT OT speech            Respiratory    Pneumonia of right middle lobe due to infectious organism - Primary     CBC BMP chest x-ray albuterol oxygen Augmentin probiotics            Circulatory    Type 2 diabetes mellitus with diabetic peripheral angiopathy without gangrene (CMS/HCC)     Diabetes is chronic disease, it does not get cured but it can be controlled, in modern medicine there are variety of measures taken to control DM. Usually we want to preserve beta cell functions. Please understand the disease and how our life style can affect control of glycemia. Diabetes leads to macro and microvascular complications and they could be devastating. It is important to have annual eye check and frequent foot inspection and foot inspection. Kidney dysfunction including dialysis, foot amputations, neuropathy, foot ulcers and accelerated atherosclerotic vascular disease are known complications of uncontrolled DM, pt was educated and explained.             Digestive    GERD without esophagitis     Tums Mylanta Protonix also advised to get a Mycostatin oral solution swish and swallow with  Diflucan            Musculoskeletal    Acute osteomyelitis of spine (CMS/HCC)     Check CBC BMP sed rate x-ray of the spine infectious disease and neuro surgery evaluation follow-up            Hematologic    Pancytopenia (CMS/HCC)     Repeat CBC iron reticulocyte count B12 folic acid        Pt was seen as a initial visit at skilled care facility after recent hospitalization, pt is feeling weak and in the process of convalescence. Hospitalization data, imaging studies, labs and consultations and DC summary reviewed. Assessed by physical therapy during stay and considered for short term skilled stay. Initial skin assessment reviewed through nursing staff. Catheter or any lines also has been checked and inquired though nursing staff. usually convalescence process is prolonged and pt feels weak and tired. AEMR if applies reviewed. Problem list reviewed and compiled. Discussed with nursing staff.   Source of history: Nurse, Medical personnel, Medical record, Patient.  History limitation: None.      HPI  This is a 77-year-old patient with intractable pain history of the weight loss back pain hip pain more on the right compared to left when for pain management seen by pain management PCP oncologist CT-guided biopsy difficulty to manage pain as there is a pancytopenia infection inflammation malignancy cannot be rule out given epidural block Percocet Dilaudid continue decreased ADL decreased mobility without involvement of the bladder or bowels for physical therapy occupational therapy and pain medication    Past medical history of chronic back pain osteoarthritis hip umbilical hernia    Father have high blood pressure pneumonia    Mother had lung cancer    Son have hypertension    Social history ex-smoker nondrinker    Review hospital record diagnosis of unilateral primary osteoarthritis stroke back pain hypertension diabetes carpal tunnel bilateral sacroiliitis pancytopenia destructive bone lesion L3-L4 endplate  significant weight loss other comorbid condition sacroiliitis carpal tunnel CHF diabetes hernia pancytopenia spondylolisthesis radiculopathy back pain TIA weight loss    Procedure epidural block CT-guided biopsy O surgery laminectomy hip surgery umbilical hernia repair  Not on File    Medications  Order Order Status Revised Last Ordered    Calcium Carbonate Oral Tablet Chewable 500 MG (Calcium Carbonate (Antacid)) Active 4/13/2023   This order is potentially duplicated by other orders on the chart. Click to view details.    oxyCODONE HCl Oral Tablet 5 MG (Oxycodone HCl) Active 4/13/2023 4/13/2023    Tuberculin PPD Solution Active 4/14/2023     Aspirin Oral Tablet 325 MG (Aspirin) Active 4/14/2023     metFORMIN HCl Oral Tablet 1000 MG (Metformin HCl) Active 4/14/2023 4/13/2023  This order is potentially duplicated by other orders on the chart. Click to view details.    oxyCODONE-Acetaminophen Oral Tablet 5-325 MG (Oxycodone w/ Acetaminophen) Active 4/13/2023 4/13/2023    Multivitamin-Minerals Oral Tablet (Multiple Vitamins w/ Minerals) Active 4/14/2023     Glucosamine-Chondroitin Oral Tablet Chewable 750-600 MG (Glucosamine-Chondroitin) Active 4/14/2023 4/13/2023    Rosuvastatin Calcium Oral Tablet 5 MG (Rosuvastatin Calcium) Active 4/14/2023 4/13/2023    Omeprazole 20 MG Capsule delayed release Active 4/14/2023 4/14/2023    Fluconazole Oral Tablet 100 MG (Fluconazole) Active 4/14/2023     DAPTOmycin Intravenous Solution Reconstituted 500 MG (Daptomycin) Active 4/15/2023 4/14/2023    Gabapentin Oral Capsule 100 MG (Gabapentin) Active 4/14/2023 4/14/2023    Normal Saline Flush Active 4/15/2023 4/15/2023        Objective    Visit Vitals  Smoking Status Former       Current Vitals     BP: 106/53 mmHg  5/7/2023 11:18  Temp:97.9 °F  5/7/2023 11:18  Pulse:78 bpm  5/7/2023 11:18  Weight:113.2 Lbs  5/2/2023 08:32  Resp:18 Breaths/min  5/7/2023 11:18  BS:215 mg/dL  5/7/2023 08:06  O2:96 %  5/7/2023 11:18  Pain:0  5/7/2023  11:18  PHYSICAL EXAM  General: NAD. NCAT. Aox3   HEENT: PERRLA. EOMI. MMM. Nares patent bl.  Cardiovascular: Heart murmur  Respiratory: Crackles rales rhonchi GI: Soft, NT abdomen. BS present x 4.   : No CVAT BL  MSK: Arthritis tenderness of the lumbosacral spine and both hip  Extremities: No edema. Cap refill < 2 sec.   Skin: Sacral pressure ulcer stage II neuro: Lumbar cervical radiculopathy psych: Mood wnl.      Physical Exam  Cardiovascular:      Pulses:           Dorsalis pedis pulses are 2+ on the right side and 2+ on the left side.        Posterior tibial pulses are 2+ on the right side and 2+ on the left side.   Musculoskeletal:      Right foot: No deformity.      Left foot: No deformity.   Feet:      Right foot:      Protective Sensation: 5 sites tested.        Skin integrity: Skin integrity normal.      Toenail Condition: Right toenails are normal.      Left foot:      Protective Sensation: 5 sites tested.        Skin integrity: Skin integrity normal.      Toenail Condition: Left toenails are normal.                 Radiology: Right lung pneumonia No family history on file.  Social History     Socioeconomic History   • Marital status:      Spouse name: None   • Number of children: None   • Years of education: None   • Highest education level: None   Occupational History   • None   Tobacco Use   • Smoking status: Former     Types: Cigarettes   • Smokeless tobacco: Never   Vaping Use   • Vaping status: None   Substance and Sexual Activity   • Alcohol use: Not Currently   • Drug use: Not Currently   • Sexual activity: None   Other Topics Concern   • None   Social History Narrative   • None     Social Determinants of Health     Financial Resource Strain: Not on file   Food Insecurity: Not on file   Transportation Needs: Not on file   Physical Activity: Not on file   Stress: Not on file   Social Connections: Not on file   Intimate Partner Violence: Not on file   Housing Stability: Not on file      History reviewed. No pertinent past medical history.  History reviewed. No pertinent surgical history.  * Cannot find OR log *    Charting was completed using voice recognition technology and may include unintended errors.           Electronically Signed By: Corey Atkinson MD   5/7/23  2:24 PM

## 2023-05-07 NOTE — ASSESSMENT & PLAN NOTE
Check CBC BMP sed rate x-ray of the spine infectious disease and neuro surgery evaluation follow-up

## 2023-05-07 NOTE — PROGRESS NOTES
Patient ID: Vahid Monahan is a 77 y.o. male who presents for   Skilled services for therapy, nursing observation and care plan implementation. S/P hosp stay for back, hip pain, past medical hx., OA Lt.hip, Umbilical hernia, atroke, HTN, DM. Monitor for pain, ADL level of participation. Monitor lab work and VS as indicated.   77-year-old patient complains of cough congestion shortness of breath diagnosis of right lobe pneumonia given antibiotic probiotic chest x-ray CBC with differential albuterol as a treatment comorbid condition CVA hypertension hyperlipidemia diabetes failure to thrive severe protein calorie malnutrition pressure ulcer sacrum  Assessment/Plan     Problem List Items Addressed This Visit          Nervous    Cerebrovascular accident (CVA) (CMS/HCC)     PT OT speech            Respiratory    Pneumonia of right middle lobe due to infectious organism - Primary     CBC BMP chest x-ray albuterol oxygen Augmentin probiotics            Circulatory    Type 2 diabetes mellitus with diabetic peripheral angiopathy without gangrene (CMS/HCC)     Diabetes is chronic disease, it does not get cured but it can be controlled, in modern medicine there are variety of measures taken to control DM. Usually we want to preserve beta cell functions. Please understand the disease and how our life style can affect control of glycemia. Diabetes leads to macro and microvascular complications and they could be devastating. It is important to have annual eye check and frequent foot inspection and foot inspection. Kidney dysfunction including dialysis, foot amputations, neuropathy, foot ulcers and accelerated atherosclerotic vascular disease are known complications of uncontrolled DM, pt was educated and explained.             Digestive    GERD without esophagitis     Tums Mylanta Protonix also advised to get a Mycostatin oral solution swish and swallow with Diflucan            Musculoskeletal    Acute osteomyelitis of spine  (CMS/HCC)     Check CBC BMP sed rate x-ray of the spine infectious disease and neuro surgery evaluation follow-up            Hematologic    Pancytopenia (CMS/HCC)     Repeat CBC iron reticulocyte count B12 folic acid        Pt was seen as a initial visit at skilled care facility after recent hospitalization, pt is feeling weak and in the process of convalescence. Hospitalization data, imaging studies, labs and consultations and DC summary reviewed. Assessed by physical therapy during stay and considered for short term skilled stay. Initial skin assessment reviewed through nursing staff. Catheter or any lines also has been checked and inquired though nursing staff. usually convalescence process is prolonged and pt feels weak and tired. AEMR if applies reviewed. Problem list reviewed and compiled. Discussed with nursing staff.   Source of history: Nurse, Medical personnel, Medical record, Patient.  History limitation: None.      HPI  This is a 77-year-old patient with intractable pain history of the weight loss back pain hip pain more on the right compared to left when for pain management seen by pain management PCP oncologist CT-guided biopsy difficulty to manage pain as there is a pancytopenia infection inflammation malignancy cannot be rule out given epidural block Percocet Dilaudid continue decreased ADL decreased mobility without involvement of the bladder or bowels for physical therapy occupational therapy and pain medication    Past medical history of chronic back pain osteoarthritis hip umbilical hernia    Father have high blood pressure pneumonia    Mother had lung cancer    Son have hypertension    Social history ex-smoker nondrinker    Review hospital record diagnosis of unilateral primary osteoarthritis stroke back pain hypertension diabetes carpal tunnel bilateral sacroiliitis pancytopenia destructive bone lesion L3-L4 endplate significant weight loss other comorbid condition sacroiliitis carpal tunnel  CHF diabetes hernia pancytopenia spondylolisthesis radiculopathy back pain TIA weight loss    Procedure epidural block CT-guided biopsy O surgery laminectomy hip surgery umbilical hernia repair  Not on File    Medications  Order Order Status Revised Last Ordered    Calcium Carbonate Oral Tablet Chewable 500 MG (Calcium Carbonate (Antacid)) Active 4/13/2023   This order is potentially duplicated by other orders on the chart. Click to view details.    oxyCODONE HCl Oral Tablet 5 MG (Oxycodone HCl) Active 4/13/2023 4/13/2023    Tuberculin PPD Solution Active 4/14/2023     Aspirin Oral Tablet 325 MG (Aspirin) Active 4/14/2023     metFORMIN HCl Oral Tablet 1000 MG (Metformin HCl) Active 4/14/2023 4/13/2023  This order is potentially duplicated by other orders on the chart. Click to view details.    oxyCODONE-Acetaminophen Oral Tablet 5-325 MG (Oxycodone w/ Acetaminophen) Active 4/13/2023 4/13/2023    Multivitamin-Minerals Oral Tablet (Multiple Vitamins w/ Minerals) Active 4/14/2023     Glucosamine-Chondroitin Oral Tablet Chewable 750-600 MG (Glucosamine-Chondroitin) Active 4/14/2023 4/13/2023    Rosuvastatin Calcium Oral Tablet 5 MG (Rosuvastatin Calcium) Active 4/14/2023 4/13/2023    Omeprazole 20 MG Capsule delayed release Active 4/14/2023 4/14/2023    Fluconazole Oral Tablet 100 MG (Fluconazole) Active 4/14/2023     DAPTOmycin Intravenous Solution Reconstituted 500 MG (Daptomycin) Active 4/15/2023 4/14/2023    Gabapentin Oral Capsule 100 MG (Gabapentin) Active 4/14/2023 4/14/2023    Normal Saline Flush Active 4/15/2023 4/15/2023        Objective     Visit Vitals  Smoking Status Former       Current Vitals     BP: 106/53 mmHg  5/7/2023 11:18  Temp:97.9 °F  5/7/2023 11:18  Pulse:78 bpm  5/7/2023 11:18  Weight:113.2 Lbs  5/2/2023 08:32  Resp:18 Breaths/min  5/7/2023 11:18  BS:215 mg/dL  5/7/2023 08:06  O2:96 %  5/7/2023 11:18  Pain:0  5/7/2023 11:18  PHYSICAL EXAM  General: NAD. NCAT. Aox3   HEENT: CARI. BELIAMI. MMM.  Nares patent bl.  Cardiovascular: Heart murmur  Respiratory: Crackles rales rhonchi GI: Soft, NT abdomen. BS present x 4.   : No CVAT BL  MSK: Arthritis tenderness of the lumbosacral spine and both hip  Extremities: No edema. Cap refill < 2 sec.   Skin: Sacral pressure ulcer stage II neuro: Lumbar cervical radiculopathy psych: Mood wnl.      Physical Exam  Cardiovascular:      Pulses:           Dorsalis pedis pulses are 2+ on the right side and 2+ on the left side.        Posterior tibial pulses are 2+ on the right side and 2+ on the left side.   Musculoskeletal:      Right foot: No deformity.      Left foot: No deformity.   Feet:      Right foot:      Protective Sensation: 5 sites tested.        Skin integrity: Skin integrity normal.      Toenail Condition: Right toenails are normal.      Left foot:      Protective Sensation: 5 sites tested.        Skin integrity: Skin integrity normal.      Toenail Condition: Left toenails are normal.                 Radiology: Right lung pneumonia No family history on file.  Social History     Socioeconomic History    Marital status:      Spouse name: None    Number of children: None    Years of education: None    Highest education level: None   Occupational History    None   Tobacco Use    Smoking status: Former     Types: Cigarettes    Smokeless tobacco: Never   Vaping Use    Vaping status: None   Substance and Sexual Activity    Alcohol use: Not Currently    Drug use: Not Currently    Sexual activity: None   Other Topics Concern    None   Social History Narrative    None     Social Determinants of Health     Financial Resource Strain: Not on file   Food Insecurity: Not on file   Transportation Needs: Not on file   Physical Activity: Not on file   Stress: Not on file   Social Connections: Not on file   Intimate Partner Violence: Not on file   Housing Stability: Not on file     History reviewed. No pertinent past medical history.  History reviewed. No pertinent surgical  history.  * Cannot find OR log *    Charting was completed using voice recognition technology and may include unintended errors.

## 2023-07-16 ENCOUNTER — NURSING HOME VISIT (OUTPATIENT)
Dept: POST ACUTE CARE | Facility: EXTERNAL LOCATION | Age: 78
End: 2023-07-16
Payer: MEDICARE

## 2023-07-16 DIAGNOSIS — I63.9 CEREBROVASCULAR ACCIDENT (CVA), UNSPECIFIED MECHANISM (MULTI): ICD-10-CM

## 2023-07-16 DIAGNOSIS — E11.51 TYPE 2 DIABETES MELLITUS WITH DIABETIC PERIPHERAL ANGIOPATHY WITHOUT GANGRENE, WITHOUT LONG-TERM CURRENT USE OF INSULIN (MULTI): ICD-10-CM

## 2023-07-16 DIAGNOSIS — M46.20: Primary | ICD-10-CM

## 2023-07-16 DIAGNOSIS — E11.51 TYPE 2 DIABETES MELLITUS WITH DIABETIC PERIPHERAL ANGIOPATHY WITHOUT GANGRENE, UNSPECIFIED WHETHER LONG TERM INSULIN USE (MULTI): ICD-10-CM

## 2023-07-16 DIAGNOSIS — D61.818 PANCYTOPENIA (MULTI): ICD-10-CM

## 2023-07-16 DIAGNOSIS — E44.0 MODERATE PROTEIN-CALORIE MALNUTRITION (MULTI): ICD-10-CM

## 2023-07-16 DIAGNOSIS — I63.81 CEREBROVASCULAR ACCIDENT (CVA) DUE TO OCCLUSION OF SMALL ARTERY (MULTI): ICD-10-CM

## 2023-07-16 DIAGNOSIS — M86.9 OSTEOMYELITIS, UNSPECIFIED SITE, UNSPECIFIED TYPE (MULTI): ICD-10-CM

## 2023-07-16 DIAGNOSIS — M46.47 DISCITIS OF LUMBOSACRAL REGION: ICD-10-CM

## 2023-07-16 PROCEDURE — 99309 SBSQ NF CARE MODERATE MDM 30: CPT | Performed by: INTERNAL MEDICINE

## 2023-07-16 NOTE — PROGRESS NOTES
Name: Vahid Monahan  YOB: 1945    FOLLOW UP VISIT:   Allergies: No Known Allergies   Code Status: CPR    Special Instructions: Skilled services for PT/OT/ST nursing observation and care plan implementation. S/P hosp stay for back, hip pain, past medical hx., OA Lt.hip, Umbilical hernia, atroke, HTN, DM. Monitor for pain, ADL level of participation. Monitor lab work and VS as indicated.   Diet: Regular diet, Dys Puree texture, Thin liquids consistency  Diagnosis: UNILATERAL PRIMARY OSTEOARTHRITIS, LEFT HIP   SUBJECTIVE:77-year-old patient who had a history of poor appetite chronic pain diabetes advised dietitian evaluation continue tramadol check sed rate CBC BMP TSH lipid hemoglobin A1c have a family meeting regarding the advance care planning    REVIEW OF SYSTEMS:   All review of systems are negative unless otherwise stated above under subjective.    LABS REVIEWED AT FACILITY:    MEDICATIONS REVIEWED AT FACILITY:   Alert Order Order Status Revised Last Ordered    Calcium Carbonate Oral Tablet Chewable 500 MG (Calcium Carbonate (Antacid)) Active 6/13/2023     Aspirin Oral Tablet 325 MG (Aspirin) Active 5/21/2023     metFORMIN HCl Oral Tablet 1000 MG (Metformin HCl) Active 6/1/2023 6/1/2023  This order is potentially duplicated by other orders on the chart. Click to view details.    Multivitamin-Minerals Oral Tablet (Multiple Vitamins w/ Minerals) Active 4/14/2023     Rosuvastatin Calcium Oral Tablet 5 MG (Rosuvastatin Calcium) Active 7/10/2023 7/10/2023    Insulin Glargine Solution 100 UNIT/ML Active 7/7/2023 7/7/2023    HumaLOG KwikPen 100 UNIT/ML Solution pen-injector Active 7/12/2023 7/12/2023    Magnesium Oral Tablet 400 MG (Magnesium) Active 5/13/2023     Glucosamine Sulfate Oral Capsule 500 MG (Glucosamine Sulfate) Active 6/12/2023     traMADol HCl Oral Tablet 50 MG (Tramadol HCl) Active 7/10/2023 7/10/2023    Docusate Sodium Oral Liquid 100 MG/10ML (Docusate Sodium) Active 7/10/2023      Dronabinol Capsule 2.5 MG Active 7/13/2023 7/13/2023  This order is potentially duplicated by other orders on the chart. Click to view details.    Multi-Vitamin/Iron Oral Tablet (Multiple Vitamins w/ Iron) Active 7/16/2023   Living will related issues reviewed-Code status:     OBJECTIVE:   ICD-9 ICD-10 Short Description  4/13/2023    M16.12 UNILATERAL PRIMARY OSTEOARTHRITIS, LEFT HIP  4/13/2023    I63.9 CEREBRAL INFARCTION, UNSPECIFIED  4/13/2023    M54.50 LOW BACK PAIN, UNSPECIFIED  4/13/2023    I10 ESSENTIAL (PRIMARY) HYPERTENSION  4/13/2023    E11.9 TYPE 2 DIABETES MELLITUS WITHOUT COMPLICATIONS  4/13/2023    M46.1 SACROILIITIS, NOT ELSEWHERE CLASSIFIED  4/13/2023    G56.02 CARPAL TUNNEL SYNDROME, LEFT UPPER LIMB  4/13/2023    I50.9 HEART FAILURE, UNSPECIFIED  4/13/2023    R13.10 DYSPHAGIA, UNSPECIFIED  4/13/2023    E43 UNSPECIFIED SEVERE PROTEIN-CALORIE MALNUTRITION  4/13/2023    E78.5 HYPERLIPIDEMIA, UNSPECIFIED  4/13/2023    L89.159 PRESSURE ULCER OF SACRAL REGION, UNSPECIFIED STAGE  4/13/2023    D61.818 OTHER PANCYTOPENIA  4/13/2023    Z43.1 ENCOUNTER FOR ATTENTION TO GASTROSTOMY  4/13/2023    M46.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  Physical Exam    Current Vitals     BP: 99/47 mmHg  7/10/2023 11:33  Temp:98.3 °F  7/15/2023 15:07  Pulse:75 bpm  7/10/2023 11:33    Weight:118.7 Lbs  7/10/2023 13:52  Resp:16 Breaths/min  7/10/2023 11:33  BS:428 mg/dL  7/16/2023 08:30  O2:96 %  7/10/2023 11:33  Pain:0  7/16/2023 10:09    Physical Exam:    Cachexia of chronic  disease    Skin: Pressure ulcer    Eyes: PERRLA, EOMs intact,  Conjunctiva pink with no redness or exudates. Cornea & anterior chamber are clear, Eyelids without lesions. No scleral icterus.     ENT: Deafness  Neck: Supple  Pulmonary: No accessory muscle use or stridor.    Cardiac: Heart murmur JVD, Carotids without bruits.    Abdomen: Tenderness.  No CVA tenderness.    Genitourinary: Exam deferred.    Musculoskeletal: Muscle mass loss no cyanosis, clubbing, or edema.    Neurological: Dementia per DSM 5 (2013): Major Depressive Disorder 5+ symptoms must be present consistently nearly every day in a 2 week period and illustrate a deviation from baseline:      Depressed mood most of the day*    Markedly diminished interest or pleasure in all/nearly all activities most days*    Significant unintended weight loss (or weight gain)    Insomnia or hypersomnia    Psychomotor agitation or retardation    Fatigue or energy loss    Feeling of worthlessness or excessive or inappropriate guilt    Diminished ability to think, concentrate or indecisiveness    Recurrent thoughts of death, suicidal ideation without a specific plan, suicide attempt, or specific suicidal plan     *One of the symptoms must be depressed mood or loss of interest/pleasure.   Suicidal Ideation and suicidal behaviors    If concern for suicidal risk refer to mental health professional and arrange emergency transport if indicated    If concern for bipolar disorder, refer to mental health professional      PHQ9 Score- Compare and contrast previous PHQ9 scores at each visit to previous PHQ9    It is important to observe a trend in the decrease of the score    To calculate % Change PHQ9 total score from baseline to current visit: (Baseline score minus current score)/ divided by baseline score    Pay attention to question 9 for suicidal risk      Symptoms of depression      Change from patientbaseline     Side Effect Tolerability      Patient Feedback       Anticholinergic    Drowsiness    Insomnia/agitation    Orthostatic hypotension    QTc Prolongation    Gastrointestinal toxicity    Weight gain    Sexual dysfunction     If remission is achieved continue the same treatment including the same medication(s) dose for 6-12 months from the first major depressive episode to prevent relapse.    Exercise    Sleep hygiene    Healthy diet   Stress management   Light therapy for seasonal affective disorder    Meditation/Mindfulness   Continue current medication if no better advised to get a psych evaluation psychotherapies and psychiatric M.D. peripheral name and number was given to patient  Advised to follow-up every 3-4 months and check PHQ  GOAL= improve quality of life /PHQ LESS THAN 4  Psychiatric: Appropriate mood and affect.     Assessment/Plan   Problem List Items Addressed This Visit          Endocrine/Metabolic    Type 2 diabetes mellitus with diabetic peripheral angiopathy without gangrene (CMS/HCC)    Moderate protein-calorie malnutrition (CMS/HCC)       Hematology and Neoplasia    Pancytopenia (CMS/HCC)       Infectious Diseases    Osteomyelitis (CMS/HCC)    Discitis of lumbosacral region    Acute osteomyelitis of spine (CMS/HCC) - Primary       Neuro    Cerebrovascular accident (CVA) (CMS/[facility])     This patient was seen for my regular monthly visit, nursing evaluations and nursing notes were reviewed, interim events are reviewed, interim concerns and messages were reviewed as we have communicated with nursing staff.  Any issues with the falls, skin care impairment, declining physical condition are reviewed and noted, diagnosis list were reviewed, list of medications were reviewed, living will related issues were reviewed, overall patient has been doing well, any declining in patient's condition or any change in patient's condition needs to be notified to physician promptly, discussed with nursing staff, if needed would communicate with family.  Patient stays  confined here at the facility for long-term care, there are always concerns about long-term care related issues and concerns.  Nursing staff is trying their best to keep patient safe, all sort of measures has been taken to keep patient safe and comfortable.   Skin integrity:  Nursing to monitor skin integrity as patient is at risk for pressure injuries.  Wound care per nursing    See Facility notes for measurements/assessments  Turn and reposition Q 2 hours or more  Air mattress and when up in chair cushion reducing device  Dietician to evaluate and recommend:  Nutritional supplement:  Please monitor skin integrity and other pressure areas  Referral to wound clinic if appropriate:    PLAN:  Pt has been seen for follow up visit.  The patient is here at facility for PT/OT/ST to maximize strength, function, endurance and safety.  The patient is participating in therapy. Vahid Monahan is making progress to the best of his/her ability.   Please see PT/OT/ST notes in the facility for detailed information regarding progression of patients progress.   Recent nursing evaluation and notes were reviewed.   Overall, patient is stable despite his/her chronic conditions.    #  Any decline or change in condition needs to be communicated with the physician or myself.    Discussion with nursing staff regarding ongoing care and management.  If needed, would communicate with family who are not present at this time.   There are no concerns at this time.  We will continue with the medications noted above.    We will continue to follow the patient here at the facility.    Discharge planning:   Discharge Home when goals are met.   Follow up with PCP in 1-2 weeks after discharge from facility.   [facility] to follow after discharge for continued PT/OT and Nursing.    *Please note that nursing facility, outside laboratory agency, and [facility] do not interface.     Completion of the note was done through Dragon voice recognition technology and may  include   unintended or grammatical errors which may not have been recognized when finalizing the note.     Time:    Corey Atkinson MD

## 2023-07-16 NOTE — LETTER
Patient: Vahid Monahan  : 1945    Encounter Date: 2023    Name: Vahid Monahan  YOB: 1945    FOLLOW UP VISIT:   Allergies: No Known Allergies   Code Status: CPR    Special Instructions: Skilled services for PT/OT/ST nursing observation and care plan implementation. S/P hosp stay for back, hip pain, past medical hx., OA Lt.hip, Umbilical hernia, atroke, HTN, DM. Monitor for pain, ADL level of participation. Monitor lab work and VS as indicated.   Diet: Regular diet, Dys Puree texture, Thin liquids consistency  Diagnosis: UNILATERAL PRIMARY OSTEOARTHRITIS, LEFT HIP   SUBJECTIVE:77-year-old patient who had a history of poor appetite chronic pain diabetes advised dietitian evaluation continue tramadol check sed rate CBC BMP TSH lipid hemoglobin A1c have a family meeting regarding the advance care planning    REVIEW OF SYSTEMS:   All review of systems are negative unless otherwise stated above under subjective.    LABS REVIEWED AT FACILITY:    MEDICATIONS REVIEWED AT FACILITY:   Alert Order Order Status Revised Last Ordered    Calcium Carbonate Oral Tablet Chewable 500 MG (Calcium Carbonate (Antacid)) Active 2023     Aspirin Oral Tablet 325 MG (Aspirin) Active 2023     metFORMIN HCl Oral Tablet 1000 MG (Metformin HCl) Active 2023  This order is potentially duplicated by other orders on the chart. Click to view details.    Multivitamin-Minerals Oral Tablet (Multiple Vitamins w/ Minerals) Active 2023     Rosuvastatin Calcium Oral Tablet 5 MG (Rosuvastatin Calcium) Active 7/10/2023 7/10/2023    Insulin Glargine Solution 100 UNIT/ML Active 2023    HumaLOG KwikPen 100 UNIT/ML Solution pen-injector Active 2023    Magnesium Oral Tablet 400 MG (Magnesium) Active 2023     Glucosamine Sulfate Oral Capsule 500 MG (Glucosamine Sulfate) Active 2023     traMADol HCl Oral Tablet 50 MG (Tramadol HCl) Active 7/10/2023 7/10/2023    Docusate  Sodium Oral Liquid 100 MG/10ML (Docusate Sodium) Active 7/10/2023     Dronabinol Capsule 2.5 MG Active 7/13/2023 7/13/2023  This order is potentially duplicated by other orders on the chart. Click to view details.    Multi-Vitamin/Iron Oral Tablet (Multiple Vitamins w/ Iron) Active 7/16/2023   Living will related issues reviewed-Code status:     OBJECTIVE:   ICD-9 ICD-10 Short Description  4/13/2023    M16.12 UNILATERAL PRIMARY OSTEOARTHRITIS, LEFT HIP  4/13/2023    I63.9 CEREBRAL INFARCTION, UNSPECIFIED  4/13/2023    M54.50 LOW BACK PAIN, UNSPECIFIED  4/13/2023    I10 ESSENTIAL (PRIMARY) HYPERTENSION  4/13/2023    E11.9 TYPE 2 DIABETES MELLITUS WITHOUT COMPLICATIONS  4/13/2023    M46.1 SACROILIITIS, NOT ELSEWHERE CLASSIFIED  4/13/2023    G56.02 CARPAL TUNNEL SYNDROME, LEFT UPPER LIMB  4/13/2023    I50.9 HEART FAILURE, UNSPECIFIED  4/13/2023    R13.10 DYSPHAGIA, UNSPECIFIED  4/13/2023    E43 UNSPECIFIED SEVERE PROTEIN-CALORIE MALNUTRITION  4/13/2023    E78.5 HYPERLIPIDEMIA, UNSPECIFIED  4/13/2023    L89.159 PRESSURE ULCER OF SACRAL REGION, UNSPECIFIED STAGE  4/13/2023    D61.818 OTHER PANCYTOPENIA  4/13/2023    Z43.1 ENCOUNTER FOR ATTENTION TO GASTROSTOMY  4/13/2023    M46.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  Physical Exam    Current Vitals     BP: 99/47 mmHg  7/10/2023 11:33  Temp:98.3 °F  7/15/2023 15:07  Pulse:75 bpm  7/10/2023 11:33    Weight:118.7 Lbs  7/10/2023 13:52  Resp:16 Breaths/min  7/10/2023 11:33  BS:428 mg/dL  7/16/2023 08:30  O2:96 %  7/10/2023  11:33  Pain:0  7/16/2023 10:09    Physical Exam:    Cachexia of chronic disease    Skin: Pressure ulcer    Eyes: PERRLA, EOMs intact,  Conjunctiva pink with no redness or exudates. Cornea & anterior chamber are clear, Eyelids without lesions. No scleral icterus.     ENT: Deafness  Neck: Supple  Pulmonary: No accessory muscle use or stridor.    Cardiac: Heart murmur JVD, Carotids without bruits.    Abdomen: Tenderness.  No CVA tenderness.    Genitourinary: Exam deferred.    Musculoskeletal: Muscle mass loss no cyanosis, clubbing, or edema.    Neurological: Dementia per DSM 5 (2013): Major Depressive Disorder 5+ symptoms must be present consistently nearly every day in a 2 week period and illustrate a deviation from baseline:      Depressed mood most of the day*    Markedly diminished interest or pleasure in all/nearly all activities most days*    Significant unintended weight loss (or weight gain)    Insomnia or hypersomnia    Psychomotor agitation or retardation    Fatigue or energy loss    Feeling of worthlessness or excessive or inappropriate guilt    Diminished ability to think, concentrate or indecisiveness    Recurrent thoughts of death, suicidal ideation without a specific plan, suicide attempt, or specific suicidal plan     *One of the symptoms must be depressed mood or loss of interest/pleasure.   Suicidal Ideation and suicidal behaviors    If concern for suicidal risk refer to mental health professional and arrange emergency transport if indicated    If concern for bipolar disorder, refer to mental health professional      PHQ9 Score- Compare and contrast previous PHQ9 scores at each visit to previous PHQ9    It is important to observe a trend in the decrease of the score    To calculate % Change PHQ9 total score from baseline to current visit: (Baseline score minus current score)/ divided by baseline score    Pay attention to question 9 for suicidal risk      Symptoms of depression      Change from  patientbaseline     Side Effect Tolerability      Patient Feedback      Anticholinergic    Drowsiness    Insomnia/agitation    Orthostatic hypotension    QTc Prolongation    Gastrointestinal toxicity    Weight gain    Sexual dysfunction     If remission is achieved continue the same treatment including the same medication(s) dose for 6-12 months from the first major depressive episode to prevent relapse.    Exercise    Sleep hygiene    Healthy diet   Stress management   Light therapy for seasonal affective disorder    Meditation/Mindfulness   Continue current medication if no better advised to get a psych evaluation psychotherapies and psychiatric M.D. peripheral name and number was given to patient  Advised to follow-up every 3-4 months and check PHQ  GOAL= improve quality of life /PHQ LESS THAN 4  Psychiatric: Appropriate mood and affect.     Assessment/Plan  Problem List Items Addressed This Visit          Endocrine/Metabolic    Type 2 diabetes mellitus with diabetic peripheral angiopathy without gangrene (CMS/HCC)    Moderate protein-calorie malnutrition (CMS/HCC)       Hematology and Neoplasia    Pancytopenia (CMS/HCC)       Infectious Diseases    Osteomyelitis (CMS/HCC)    Discitis of lumbosacral region    Acute osteomyelitis of spine (CMS/HCC) - Primary       Neuro    Cerebrovascular accident (CVA) (CMS/[facility])     This patient was seen for my regular monthly visit, nursing evaluations and nursing notes were reviewed, interim events are reviewed, interim concerns and messages were reviewed as we have communicated with nursing staff.  Any issues with the falls, skin care impairment, declining physical condition are reviewed and noted, diagnosis list were reviewed, list of medications were reviewed, living will related issues were reviewed, overall patient has been doing well, any declining in patient's condition or any change in patient's condition needs to be notified to physician promptly, discussed with  nursing staff, if needed would communicate with family.  Patient stays confined here at the facility for long-term care, there are always concerns about long-term care related issues and concerns.  Nursing staff is trying their best to keep patient safe, all sort of measures has been taken to keep patient safe and comfortable.   Skin integrity:  Nursing to monitor skin integrity as patient is at risk for pressure injuries.  Wound care per nursing    See Facility notes for measurements/assessments  Turn and reposition Q 2 hours or more  Air mattress and when up in chair cushion reducing device  Dietician to evaluate and recommend:  Nutritional supplement:  Please monitor skin integrity and other pressure areas  Referral to wound clinic if appropriate:    PLAN:  Pt has been seen for follow up visit.  The patient is here at facility for PT/OT/ST to maximize strength, function, endurance and safety.  The patient is participating in therapy. Vahid Monahan is making progress to the best of his/her ability.   Please see PT/OT/ST notes in the facility for detailed information regarding progression of patients progress.   Recent nursing evaluation and notes were reviewed.   Overall, patient is stable despite his/her chronic conditions.    #  Any decline or change in condition needs to be communicated with the physician or myself.    Discussion with nursing staff regarding ongoing care and management.  If needed, would communicate with family who are not present at this time.   There are no concerns at this time.  We will continue with the medications noted above.    We will continue to follow the patient here at the facility.    Discharge planning:   Discharge Home when goals are met.   Follow up with PCP in 1-2 weeks after discharge from facility.   [facility] to follow after discharge for continued PT/OT and Nursing.    *Please note that nursing facility, outside laboratory agency, and  [facility] do not interface.     Completion of  the note was done through Dragon voice recognition technology and may include   unintended or grammatical errors which may not have been recognized when finalizing the note.     Time:    Corey Atkinson MD         Electronically Signed By: Corey Atkinson MD   7/16/23  1:10 PM

## 2023-08-23 ENCOUNTER — NURSING HOME VISIT (OUTPATIENT)
Dept: POST ACUTE CARE | Facility: EXTERNAL LOCATION | Age: 78
End: 2023-08-23
Payer: MEDICARE

## 2023-08-23 DIAGNOSIS — K21.9 GERD WITHOUT ESOPHAGITIS: ICD-10-CM

## 2023-08-23 DIAGNOSIS — D61.818 PANCYTOPENIA (MULTI): Primary | ICD-10-CM

## 2023-08-23 DIAGNOSIS — I15.2 HYPERTENSION ASSOCIATED WITH DIABETES (MULTI): ICD-10-CM

## 2023-08-23 DIAGNOSIS — M46.47 DISCITIS OF LUMBOSACRAL REGION: ICD-10-CM

## 2023-08-23 DIAGNOSIS — E11.59 HYPERTENSION ASSOCIATED WITH DIABETES (MULTI): ICD-10-CM

## 2023-08-23 DIAGNOSIS — E11.51 TYPE 2 DIABETES MELLITUS WITH DIABETIC PERIPHERAL ANGIOPATHY WITHOUT GANGRENE, WITHOUT LONG-TERM CURRENT USE OF INSULIN (MULTI): ICD-10-CM

## 2023-08-23 DIAGNOSIS — I63.9 CEREBROVASCULAR ACCIDENT (CVA), UNSPECIFIED MECHANISM (MULTI): ICD-10-CM

## 2023-08-23 PROCEDURE — 99309 SBSQ NF CARE MODERATE MDM 30: CPT | Performed by: INTERNAL MEDICINE

## 2023-08-23 NOTE — LETTER
Patient: Vahid Monahan  : 1945    Encounter Date: 2023    Name: Vahid Monahan  YOB: 1945    FOLLOW UP VISIT:   Allergies: No Known Allergies   Code Status: CPR    Special Instructions: Skilled services for PT/OT/ST nursing observation and care plan implementation. S/P hosp stay for back, hip pain, past medical hx., OA Lt.hip, Umbilical hernia, atroke, HTN, DM. Monitor for pain, ADL level of participation. Monitor lab work and VS as indicated.   Diet: Regular diet, Dys Puree texture, Thin liquids consistency  Diagnosis: UNILATERAL PRIMARY OSTEOARTHRITIS, LEFT HIP   SUBJECTIVE:    78-year-old patient who had a history of arthralgia myalgia fatigue tired hip pain back pain complaining of weakness found out have leukopenia anemia thrombocytopenia pancytopenia associated with the dehydration WBC 3.1 hemoglobin 10 hematocrit 31 platelet 122 sed rate 33 CO2 34 BUN 50 alkaline phosphatase 143 CRP less than 1.04 advised iron B12 folic acid Medrol Dosepak    Diabetes with complication moderate pain continue B12 folic acid gabapentin tramadol outpatient follow-up with him at oncologist for pancytopenia advised to follow-up with the pain management    REVIEW OF SYSTEMS: Back pain tingling numbness weakness  All review of systems are negative unless otherwise stated above under subjective.    LABS REVIEWED AT FACILITY: Anemia diabetes pancytopenia    MEDICATIONS REVIEWED AT FACILITY:   Alert Order Order Status Revised Last Ordered    Calcium Carbonate Oral Tablet Chewable 500 MG (Calcium Carbonate (Antacid)) Active 2023     Aspirin Oral Tablet 325 MG (Aspirin) Active 2023     metFORMIN HCl Oral Tablet 1000 MG (Metformin HCl) Active 2023  This order is potentially duplicated by other orders on the chart. Click to view details.    Multivitamin-Minerals Oral Tablet (Multiple Vitamins w/ Minerals) Active 2023     Rosuvastatin Calcium Oral Tablet 5 MG (Rosuvastatin  Calcium) Active 7/10/2023 7/10/2023    Insulin Glargine Solution 100 UNIT/ML Active 7/7/2023 7/7/2023    HumaLOG KwikPen 100 UNIT/ML Solution pen-injector Active 7/12/2023 7/12/2023    Magnesium Oral Tablet 400 MG (Magnesium) Active 5/13/2023     Glucosamine Sulfate Oral Capsule 500 MG (Glucosamine Sulfate) Active 6/12/2023     traMADol HCl Oral Tablet 50 MG (Tramadol HCl) Active 7/10/2023 7/10/2023    Docusate Sodium Oral Liquid 100 MG/10ML (Docusate Sodium) Active 7/10/2023     Dronabinol Capsule 2.5 MG Active 7/13/2023 7/13/2023  This order is potentially duplicated by other orders on the chart. Click to view details.    Multi-Vitamin/Iron Oral Tablet (Multiple Vitamins w/ Iron) Active 7/16/2023   Living will related issues reviewed-Code status:     OBJECTIVE:   ICD-9 ICD-10 Short Description  4/13/2023    M16.12 UNILATERAL PRIMARY OSTEOARTHRITIS, LEFT HIP  4/13/2023    I63.9 CEREBRAL INFARCTION, UNSPECIFIED  4/13/2023    M54.50 LOW BACK PAIN, UNSPECIFIED  4/13/2023    I10 ESSENTIAL (PRIMARY) HYPERTENSION  4/13/2023    E11.9 TYPE 2 DIABETES MELLITUS WITHOUT COMPLICATIONS  4/13/2023    M46.1 SACROILIITIS, NOT ELSEWHERE CLASSIFIED  4/13/2023    G56.02 CARPAL TUNNEL SYNDROME, LEFT UPPER LIMB  4/13/2023    I50.9 HEART FAILURE, UNSPECIFIED  4/13/2023    R13.10 DYSPHAGIA, UNSPECIFIED  4/13/2023    E43 UNSPECIFIED SEVERE PROTEIN-CALORIE MALNUTRITION  4/13/2023    E78.5 HYPERLIPIDEMIA, UNSPECIFIED  4/13/2023    L89.159 PRESSURE ULCER OF SACRAL REGION, UNSPECIFIED STAGE  4/13/2023    D61.818 OTHER PANCYTOPENIA  4/13/2023    Z43.1 ENCOUNTER FOR ATTENTION TO GASTROSTOMY  4/13/2023    M46.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  Physical Exam    Current Vitals     Current Vitals     BP: 107/57 mmHg  8/4/2023 15:33    Temp:97.7 °F  8/22/2023 16:20  Pulse:64 bpm  8/4/2023 15:33  Weight:119.3 Lbs  7/18/2023 12:34  Resp:16 Breaths/min  8/4/2023 15:33  BS:336 mg/dL  8/23/2023 13:24  O2:97 %  8/4/2023 15:33  Pain:0  8/23/2023 13:25    Cachexia of chronic disease    Skin: Pressure ulcer    Eyes: PERRLA, EOMs intact,  Conjunctiva pink with no redness or exudates. Cornea & anterior chamber are clear, Eyelids without lesions. No scleral icterus.     ENT: Deafness  Neck: Supple  Pulmonary: No accessory muscle use or stridor.    Cardiac: Heart murmur JVD, Carotids without bruits.    Abdomen: Tenderness.  No CVA tenderness.    Genitourinary: Exam deferred.    Musculoskeletal: Muscle mass loss no cyanosis, clubbing, or edema.    Neurological: Dementia per DSM 5 (2013): Major Depressive Disorder 5+ symptoms must be present consistently nearly every day in a 2 week period and illustrate a deviation from baseline:      Depressed mood most of the day*    Markedly diminished interest or pleasure in all/nearly all activities most days*    Significant unintended weight loss (or weight gain)    Insomnia or hypersomnia    Psychomotor agitation or retardation    Fatigue or energy loss    Feeling of worthlessness or excessive or inappropriate guilt    Diminished ability to think, concentrate or indecisiveness    Recurrent thoughts of death, suicidal ideation without a specific plan, suicide attempt, or specific suicidal plan     *One of the symptoms must be depressed mood or loss of interest/pleasure.   Suicidal Ideation and suicidal behaviors    If concern for suicidal risk refer to mental health professional and arrange emergency transport if indicated    If concern for bipolar disorder, refer to mental  health professional      PHQ9 Score- Compare and contrast previous PHQ9 scores at each visit to previous PHQ9    It is important to observe a trend in the decrease of the score    To calculate % Change PHQ9 total score from baseline to current visit: (Baseline score minus current score)/ divided by baseline score    Pay attention to question 9 for suicidal risk      Symptoms of depression      Change from patientbaseline     Side Effect Tolerability      Patient Feedback      Anticholinergic    Drowsiness    Insomnia/agitation    Orthostatic hypotension    QTc Prolongation    Gastrointestinal toxicity    Weight gain    Sexual dysfunction     If remission is achieved continue the same treatment including the same medication(s) dose for 6-12 months from the first major depressive episode to prevent relapse.    Exercise    Sleep hygiene    Healthy diet   Stress management   Light therapy for seasonal affective disorder    Meditation/Mindfulness   Continue current medication if no better advised to get a psych evaluation psychotherapies and psychiatric M.D. peripheral name and number was given to patient  Advised to follow-up every 3-4 months and check PHQ  GOAL= improve quality of life /PHQ LESS THAN 4  Psychiatric: Appropriate mood and affect.     Assessment/Plan  Problem List Items Addressed This Visit       Pancytopenia (CMS/HCC) - Primary    Type 2 diabetes mellitus with diabetic peripheral angiopathy without gangrene (CMS/HCC)     Diabetes is chronic disease, it does not get cured but it can be controlled, in modern medicine there are variety of measures taken to control DM. Usually we want to preserve beta cell functions. Please understand the disease and how our life style can affect control of glycemia. Diabetes leads to macro and microvascular complications and they could be devastating. It is important to have annual eye check and frequent foot inspection and foot inspection. Kidney dysfunction including  dialysis, foot amputations, neuropathy, foot ulcers and accelerated atherosclerotic vascular disease are known complications of uncontrolled DM, pt was educated and explained.           Hypertension associated with diabetes (CMS/[facility])     Patients BP readings reviewed and addressed, as we age our arteries turn stiffer and less elastic. Restricting salt consumption and staying physically fit with regular exercise regimen is the only way to keep our vasculature less tonic. Studies have shown that keeping ideal body wt, exercise routine about 140 to 150 minutes a week, eating variety of plant based diet and drinking plentiful water are quite helpful. Monitor BP twice or once a week at home and bring log to be reviewed by me. Uncontrolled BP has long term consequences including heart failure, myocardial infarction, accelerated atherosclerosis and kidney dysfunction. Therapy reviewed and explained.           Cerebrovascular accident (CVA) (CMS/HCC)    Discitis of lumbosacral region     Pain mm         GERD without esophagitis   This patient was seen for my regular monthly visit, nursing evaluations and nursing notes were reviewed, interim events are reviewed, interim concerns and messages were reviewed as we have communicated with nursing staff.  Any issues with the falls, skin care impairment, declining physical condition are reviewed and noted, diagnosis list were reviewed, list of medications were reviewed, living will related issues were reviewed, overall patient has been doing well, any declining in patient's condition or any change in patient's condition needs to be notified to physician promptly, discussed with nursing staff, if needed would communicate with family.  Patient stays confined here at the facility for long-term care, there are always concerns about long-term care related issues and concerns.  Nursing staff is trying their best to keep patient safe, all sort of measures has been taken to keep patient  safe and comfortable.   Skin integrity:  Nursing to monitor skin integrity as patient is at risk for pressure injuries.  Wound care per nursing    See Facility notes for measurements/assessments  Turn and reposition Q 2 hours or more  Air mattress and when up in chair cushion reducing device  Dietician to evaluate and recommend:  Nutritional supplement:  Please monitor skin integrity and other pressure areas  Referral to wound clinic if appropriate:    PLAN:  Pt has been seen for follow up visit.  The patient is here at facility for PT/OT/ST to maximize strength, function, endurance and safety.  The patient is participating in therapy. Vahid Monahan is making progress to the best of his/her ability.   Please see PT/OT/ST notes in the facility for detailed information regarding progression of patients progress.   Recent nursing evaluation and notes were reviewed.   Overall, patient is stable despite his/her chronic conditions.    #  Any decline or change in condition needs to be communicated with the physician or myself.    Discussion with nursing staff regarding ongoing care and management.  If needed, would communicate with family who are not present at this time.   There are no concerns at this time.  We will continue with the medications noted above.    We will continue to follow the patient here at the facility.    Discharge planning:   Discharge Home when goals are met.   Follow up with PCP in 1-2 weeks after discharge from facility.   [facility] to follow after discharge for continued PT/OT and Nursing.    *Please note that nursing facility, outside laboratory agency, and [facility] do not interface.     Completion of the note was done through Dragon voice recognition technology and may include   unintended or grammatical errors which may not have been recognized when finalizing the note.     Time:    Corey Atkinson MD         Electronically Signed By: Corey Atkinson MD   8/23/23  5:54 PM

## 2023-08-23 NOTE — PROGRESS NOTES
Name: Vahid Monahan  YOB: 1945    FOLLOW UP VISIT:   Allergies: No Known Allergies   Code Status: CPR    Special Instructions: Skilled services for PT/OT/ST nursing observation and care plan implementation. S/P hosp stay for back, hip pain, past medical hx., OA Lt.hip, Umbilical hernia, atroke, HTN, DM. Monitor for pain, ADL level of participation. Monitor lab work and VS as indicated.   Diet: Regular diet, Dys Puree texture, Thin liquids consistency  Diagnosis: UNILATERAL PRIMARY OSTEOARTHRITIS, LEFT HIP   SUBJECTIVE:    78-year-old patient who had a history of arthralgia myalgia fatigue tired hip pain back pain complaining of weakness found out have leukopenia anemia thrombocytopenia pancytopenia associated with the dehydration WBC 3.1 hemoglobin 10 hematocrit 31 platelet 122 sed rate 33 CO2 34 BUN 50 alkaline phosphatase 143 CRP less than 1.04 advised iron B12 folic acid Medrol Dosepak    Diabetes with complication moderate pain continue B12 folic acid gabapentin tramadol outpatient follow-up with him at oncologist for pancytopenia advised to follow-up with the pain management    REVIEW OF SYSTEMS: Back pain tingling numbness weakness  All review of systems are negative unless otherwise stated above under subjective.    LABS REVIEWED AT FACILITY: Anemia diabetes pancytopenia    MEDICATIONS REVIEWED AT FACILITY:   Alert Order Order Status Revised Last Ordered    Calcium Carbonate Oral Tablet Chewable 500 MG (Calcium Carbonate (Antacid)) Active 6/13/2023     Aspirin Oral Tablet 325 MG (Aspirin) Active 5/21/2023     metFORMIN HCl Oral Tablet 1000 MG (Metformin HCl) Active 6/1/2023 6/1/2023  This order is potentially duplicated by other orders on the chart. Click to view details.    Multivitamin-Minerals Oral Tablet (Multiple Vitamins w/ Minerals) Active 4/14/2023     Rosuvastatin Calcium Oral Tablet 5 MG (Rosuvastatin Calcium) Active 7/10/2023 7/10/2023    Insulin Glargine Solution 100  UNIT/ML Active 7/7/2023 7/7/2023    HumaLOG KwikPen 100 UNIT/ML Solution pen-injector Active 7/12/2023 7/12/2023    Magnesium Oral Tablet 400 MG (Magnesium) Active 5/13/2023     Glucosamine Sulfate Oral Capsule 500 MG (Glucosamine Sulfate) Active 6/12/2023     traMADol HCl Oral Tablet 50 MG (Tramadol HCl) Active 7/10/2023 7/10/2023    Docusate Sodium Oral Liquid 100 MG/10ML (Docusate Sodium) Active 7/10/2023     Dronabinol Capsule 2.5 MG Active 7/13/2023 7/13/2023  This order is potentially duplicated by other orders on the chart. Click to view details.    Multi-Vitamin/Iron Oral Tablet (Multiple Vitamins w/ Iron) Active 7/16/2023   Living will related issues reviewed-Code status:     OBJECTIVE:   ICD-9 ICD-10 Short Description  4/13/2023    M16.12 UNILATERAL PRIMARY OSTEOARTHRITIS, LEFT HIP  4/13/2023    I63.9 CEREBRAL INFARCTION, UNSPECIFIED  4/13/2023    M54.50 LOW BACK PAIN, UNSPECIFIED  4/13/2023    I10 ESSENTIAL (PRIMARY) HYPERTENSION  4/13/2023    E11.9 TYPE 2 DIABETES MELLITUS WITHOUT COMPLICATIONS  4/13/2023    M46.1 SACROILIITIS, NOT ELSEWHERE CLASSIFIED  4/13/2023    G56.02 CARPAL TUNNEL SYNDROME, LEFT UPPER LIMB  4/13/2023    I50.9 HEART FAILURE, UNSPECIFIED  4/13/2023    R13.10 DYSPHAGIA, UNSPECIFIED  4/13/2023    E43 UNSPECIFIED SEVERE PROTEIN-CALORIE MALNUTRITION  4/13/2023    E78.5 HYPERLIPIDEMIA, UNSPECIFIED  4/13/2023    L89.159 PRESSURE ULCER OF SACRAL REGION, UNSPECIFIED STAGE  4/13/2023    D61.818 OTHER PANCYTOPENIA  4/13/2023    Z43.1 ENCOUNTER FOR ATTENTION TO GASTROSTOMY  4/13/2023    M46.26 OSTEOMYELITIS OF VERTEBRA, LUMBAR REGION  4/13/2023    M62.81 MUSCLE WEAKNESS (GENERALIZED  4/13/2023    Z74.1 NEED FOR ASSISTANCE WITH PERSONAL CARE  4/20/2023    E44.0 MODERATE PROTEIN-CALORIE MALNUTRITION  4/13/2023    N17.9 ACUTE KIDNEY FAILURE, UNSPECIFIED  4/13/2023    R13.12 DYSPHAGIA, OROPHARYNGEAL PHASE  4/13/2023    M19.09 PRIMARY OSTEOARTHRITIS, OTHER SPECIFIED SITE  4/13/2023    R26.89 OTHER  ABNORMALITIES OF GAIT AND MOBILITY  There were no vitals taken for this visit.  Physical Exam    Current Vitals     Current Vitals     BP: 107/57 mmHg  8/4/2023 15:33    Temp:97.7 °F  8/22/2023 16:20  Pulse:64 bpm  8/4/2023 15:33  Weight:119.3 Lbs  7/18/2023 12:34  Resp:16 Breaths/min  8/4/2023 15:33  BS:336 mg/dL  8/23/2023 13:24  O2:97 %  8/4/2023 15:33  Pain:0  8/23/2023 13:25    Cachexia of chronic disease    Skin: Pressure ulcer    Eyes: PERRLA, EOMs intact,  Conjunctiva pink with no redness or exudates. Cornea & anterior chamber are clear, Eyelids without lesions. No scleral icterus.     ENT: Deafness  Neck: Supple  Pulmonary: No accessory muscle use or stridor.    Cardiac: Heart murmur JVD, Carotids without bruits.    Abdomen: Tenderness.  No CVA tenderness.    Genitourinary: Exam deferred.    Musculoskeletal: Muscle mass loss no cyanosis, clubbing, or edema.    Neurological: Dementia per DSM 5 (2013): Major Depressive Disorder 5+ symptoms must be present consistently nearly every day in a 2 week period and illustrate a deviation from baseline:      Depressed mood most of the day*    Markedly diminished interest or pleasure in all/nearly all activities most days*    Significant unintended weight loss (or weight gain)    Insomnia or hypersomnia    Psychomotor agitation or retardation    Fatigue or energy loss    Feeling of worthlessness or excessive or inappropriate guilt    Diminished ability to think, concentrate or indecisiveness    Recurrent thoughts of death, suicidal ideation without a specific plan, suicide attempt, or specific suicidal plan     *One of the symptoms must be depressed mood or loss of interest/pleasure.   Suicidal Ideation and suicidal behaviors    If concern for suicidal risk refer to mental health professional and arrange emergency transport if indicated    If concern for bipolar disorder, refer to mental health professional      PHQ9 Score- Compare and contrast previous PHQ9 scores at  each visit to previous PHQ9    It is important to observe a trend in the decrease of the score    To calculate % Change PHQ9 total score from baseline to current visit: (Baseline score minus current score)/ divided by baseline score    Pay attention to question 9 for suicidal risk      Symptoms of depression      Change from patientbaseline     Side Effect Tolerability      Patient Feedback      Anticholinergic    Drowsiness    Insomnia/agitation    Orthostatic hypotension    QTc Prolongation    Gastrointestinal toxicity    Weight gain    Sexual dysfunction     If remission is achieved continue the same treatment including the same medication(s) dose for 6-12 months from the first major depressive episode to prevent relapse.    Exercise    Sleep hygiene    Healthy diet   Stress management   Light therapy for seasonal affective disorder    Meditation/Mindfulness   Continue current medication if no better advised to get a psych evaluation psychotherapies and psychiatric M.D. peripheral name and number was given to patient  Advised to follow-up every 3-4 months and check PHQ  GOAL= improve quality of life /PHQ LESS THAN 4  Psychiatric: Appropriate mood and affect.     Assessment/Plan   Problem List Items Addressed This Visit       Pancytopenia (CMS/HCC) - Primary    Type 2 diabetes mellitus with diabetic peripheral angiopathy without gangrene (CMS/HCC)     Diabetes is chronic disease, it does not get cured but it can be controlled, in modern medicine there are variety of measures taken to control DM. Usually we want to preserve beta cell functions. Please understand the disease and how our life style can affect control of glycemia. Diabetes leads to macro and microvascular complications and they could be devastating. It is important to have annual eye check and frequent foot inspection and foot inspection. Kidney dysfunction including dialysis, foot amputations, neuropathy, foot ulcers and accelerated atherosclerotic  vascular disease are known complications of uncontrolled DM, pt was educated and explained.           Hypertension associated with diabetes (CMS/HCC)     Patients BP readings reviewed and addressed, as we age our arteries turn stiffer and less elastic. Restricting salt consumption and staying physically fit with regular exercise regimen is the only way to keep our vasculature less tonic. Studies have shown that keeping ideal body wt, exercise routine about 140 to 150 minutes a week, eating variety of plant based diet and drinking plentiful water are quite helpful. Monitor BP twice or once a week at home and bring log to be reviewed by me. Uncontrolled BP has long term consequences including heart failure, myocardial infarction, accelerated atherosclerosis and kidney dysfunction. Therapy reviewed and explained.           Cerebrovascular accident (CVA) (CMS/HCC)    Discitis of lumbosacral region     Pain mm         GERD without esophagitis   This patient was seen for my regular monthly visit, nursing evaluations and nursing notes were reviewed, interim events are reviewed, interim concerns and messages were reviewed as we have communicated with nursing staff.  Any issues with the falls, skin care impairment, declining physical condition are reviewed and noted, diagnosis list were reviewed, list of medications were reviewed, living will related issues were reviewed, overall patient has been doing well, any declining in patient's condition or any change in patient's condition needs to be notified to physician promptly, discussed with nursing staff, if needed would communicate with family.  Patient stays confined here at the facility for long-term care, there are always concerns about long-term care related issues and concerns.  Nursing staff is trying their best to keep patient safe, all sort of measures has been taken to keep patient safe and comfortable.   Skin integrity:  Nursing to monitor skin integrity as patient  is at risk for pressure injuries.  Wound care per nursing    See Facility notes for measurements/assessments  Turn and reposition Q 2 hours or more  Air mattress and when up in chair cushion reducing device  Dietician to evaluate and recommend:  Nutritional supplement:  Please monitor skin integrity and other pressure areas  Referral to wound clinic if appropriate:    PLAN:  Pt has been seen for follow up visit.  The patient is here at facility for PT/OT/ST to maximize strength, function, endurance and safety.  The patient is participating in therapy. Vahid Monahan is making progress to the best of his/her ability.   Please see PT/OT/ST notes in the facility for detailed information regarding progression of patients progress.   Recent nursing evaluation and notes were reviewed.   Overall, patient is stable despite his/her chronic conditions.    #  Any decline or change in condition needs to be communicated with the physician or myself.    Discussion with nursing staff regarding ongoing care and management.  If needed, would communicate with family who are not present at this time.   There are no concerns at this time.  We will continue with the medications noted above.    We will continue to follow the patient here at the facility.    Discharge planning:   Discharge Home when goals are met.   Follow up with PCP in 1-2 weeks after discharge from facility.   [facility] to follow after discharge for continued PT/OT and Nursing.    *Please note that nursing facility, outside laboratory agency, and  [facility] do not interface.     Completion of the note was done through Dragon voice recognition technology and may include   unintended or grammatical errors which may not have been recognized when finalizing the note.     Time:    Corey Atkinson MD

## 2023-09-09 ENCOUNTER — NURSING HOME VISIT (OUTPATIENT)
Dept: POST ACUTE CARE | Facility: EXTERNAL LOCATION | Age: 78
End: 2023-09-09
Payer: MEDICARE

## 2023-09-09 DIAGNOSIS — D61.818 PANCYTOPENIA (MULTI): ICD-10-CM

## 2023-09-09 DIAGNOSIS — E11.51 TYPE 2 DIABETES MELLITUS WITH DIABETIC PERIPHERAL ANGIOPATHY WITHOUT GANGRENE, WITHOUT LONG-TERM CURRENT USE OF INSULIN (MULTI): Primary | ICD-10-CM

## 2023-09-09 DIAGNOSIS — M86.20: ICD-10-CM

## 2023-09-09 DIAGNOSIS — J18.9 PNEUMONIA OF RIGHT MIDDLE LOBE DUE TO INFECTIOUS ORGANISM: ICD-10-CM

## 2023-09-09 DIAGNOSIS — K21.9 GERD WITHOUT ESOPHAGITIS: ICD-10-CM

## 2023-09-09 DIAGNOSIS — I15.2 HYPERTENSION ASSOCIATED WITH DIABETES (MULTI): ICD-10-CM

## 2023-09-09 DIAGNOSIS — E11.51 TYPE 2 DIABETES MELLITUS WITH DIABETIC PERIPHERAL ANGIOPATHY WITHOUT GANGRENE, UNSPECIFIED WHETHER LONG TERM INSULIN USE (MULTI): ICD-10-CM

## 2023-09-09 DIAGNOSIS — M86.9 OSTEOMYELITIS, UNSPECIFIED SITE, UNSPECIFIED TYPE (MULTI): ICD-10-CM

## 2023-09-09 DIAGNOSIS — E11.59 HYPERTENSION ASSOCIATED WITH DIABETES (MULTI): ICD-10-CM

## 2023-09-09 DIAGNOSIS — E44.0 MODERATE PROTEIN-CALORIE MALNUTRITION (MULTI): ICD-10-CM

## 2023-09-09 PROCEDURE — 99306 1ST NF CARE HIGH MDM 50: CPT | Performed by: INTERNAL MEDICINE

## 2023-09-09 NOTE — LETTER
Patient: Vahid Monahan  : 1945    Encounter Date: 2023    Name: Vahid Monahan  YOB: 1945    INITIAL NP FOLLOW UP VISIT:Allergies: No Known Allergies   Code Status: CPR    Special Instructions: Skilled services for PT/OT/ST nursing observation and care plan implementation. S/P hosp stay for back, hip pain, past medical hx., OA Lt.hip, Umbilical hernia, atroke, HTN, DM. Monitor for pain, ADL level of participation. Monitor lab work and VS as indicated.   Osteomyelitis of spine aspiration pneumonia PEG tube pancytopenia pressure ulcer weight loss uncontrolled diabetes with complication    HPI Transfer from [facility] to the [facility] admitting diagnosis back pain bilateral sacroiliitis CHF diabetes dysphagia hypertension hyperlipidemia osteomyelitis sacral decubitus ulcer    This is a 78-year-old patient who had a diabetes pancytopenia obesity esophageal stricture s/p dilatation oral candidiasis PEG tube placement brought to the emergency room poor appetite weight loss hyperglycemia blood sugar 400 x-ray showed pneumonia acute renal failure diagnosis of uncontrolled diabetes dehydration aspiration pneumonia weight loss dysphagia PEG tube placement colitis UTI hyponatremia diabetes with complication seen by cardiologist infectious disease endocrinologist gastroenterologist patient was stabilized discharge back to the [facility] azotemia bilateral pulmonary infiltrate chronic hyperglycemia dehydration diabetes oral thrush hypoxia aspiration pneumonia lumbar osteomyelitis protein calorie malnutrition iron deficiency anemia carpal tunnel CHF hip pain pancytopenia    Patient received IV Diflucan IV Protonix IV iron IV daptomycin and Zosyn PEG tube care Pickard catheter care    REVIEW OF SYSTEMS:  Review of systems are negative except where noted in HPI.    There were no vitals taken for this visit.     Physical Exam   Blood pressure 118/76 heart rate 76 respiratory 18  temperature 98.4 oxygen saturation    Cachexia of chronic disease    Skin: Pressure ulcer   eyes: PERRLA, EOMs intact,  Conjunctiva pink with no redness or exudates. Cornea & anterior chamber are clear, Eyelids without lesions. No scleral icterus.     ENT: Hearing grossly intact. External auditory canals patent, tympanic membranes intact with visible landmarks. Nares patent, mucus membranes moist. Dentition without lesions. Pharynx clear, uvula midline.     Neck: Supple, without meningismus. Thyroid not palpable. Trachea at midline. No lymphadenopathy.    Pulmonary: Decreased air entry crackles.  No accessory muscle use or stridor.    Cardiac: Heart murmur ankle edema    Abdomen: Active epigastric tenderness o rebound or guarding.  No CVA tenderness.    Genitourinary: Pickard catheter  Musculoskeletal: F arthritis of spine with tenderness   neurological: Dysarthria dysphagia neuropathy  Psychiatric: Appropriate mood and affect.  Anxiety depression      Living will related issues reviewed-CODE STATUS: Review    DISCHARGE PLANNING:  Discharge Home when goals are met.   Follow up with PCP in 1-2 weeks after discharge from facility.   [facility] to follow after discharge for continued PT/OT and Nursing.    RECENT HOSPITALIZATION DATES: [facility]  All laboratories, diagnostic tests, progress notes, and consultation notes reviewed from recent hospitalization.     LABORATORIES OR DIAGNOSTIC TESTS DONE/REVIEWED IN FACILITY: Review    MEDICATIONS REVIEWED AT THE FACILITY:     Aspirin Low Dose Oral Tablet Chewable 81 MG (Aspirin) Active 9/9/2023     Metoprolol Tartrate Oral Tablet 25 MG (Metoprolol Tartrate) Active 9/8/2023 9/8/2023    Albuterol Sulfate Inhalation Nebulization Solution (2.5 MG/3ML) 0.083% (Albuterol Sulfate) Active 9/8/2023 9/8/2023    Multivitamin-Minerals Oral Tablet (Multiple Vitamins w/ Minerals) Active 9/9/2023     Insulin Glargine Subcutaneous Solution 100 UNIT/ML (Insulin  Glargine) Active 9/9/2023 9/8/2023    Losartan Potassium Oral Tablet 25 MG (Losartan Potassium) Active 9/9/2023 9/8/2023    Escitalopram Oxalate Oral Tablet 20 MG (Escitalopram Oxalate) Active 9/9/2023 9/8/2023    traMADol HCl Oral Tablet 50 MG (Tramadol HCl) Active 9/8/2023 9/8/2023    Docusate Sodium Oral Liquid 100 MG/10ML (Docusate Sodium) Active 9/8/2023     metFORMIN HCl Oral Tablet 500 MG (Metformin HCl) Active 9/8/2023 9/8/2023    Polyethylene Glycol 3350 Oral Powder 17 GM/SCOOP (Polyethylene Glycol 3350) Active 9/8/2023     Rosuvastatin Calcium Oral Tablet 5 MG (Rosuvastatin Calcium) Active 9/8/2023 9/8/2023    Insulin Lispro Injection Solution 100 UNIT/ML (Insulin Lispro) Active 9/9/2023 9/9/2023    Magnesium Oxide Oral Tablet 400 MG (Magnesium Oxide) Active 9/10/2023   Assessment/Plan   Problem List Items Addressed This Visit       Osteomyelitis (CMS/HCC)     Infectious disease spine specialist to follow-up continue antibiotic probiotic         Pancytopenia (CMS/HCC)     Repeat CBC with differential iron B12 folic acid reticulocyte count         Type 2 diabetes mellitus with diabetic peripheral angiopathy without gangrene (CMS/[facility]) - Primary     Diabetes is chronic disease, it does not get cured but it can be controlled, in modern medicine there are variety of measures taken to control DM. Usually we want to preserve beta cell functions. Please understand the disease and how our life style can affect control of glycemia. Diabetes leads to macro and microvascular complications and they could be devastating. It is important to have annual eye check and frequent foot inspection and foot inspection. Kidney dysfunction including dialysis, foot amputations, neuropathy, foot ulcers and accelerated atherosclerotic vascular disease are known complications of uncontrolled DM, pt was educated and explained.           Hypertension associated with diabetes (CMS/HCC)     Patients BP readings reviewed and addressed, as  we age our arteries turn stiffer and less elastic. Restricting salt consumption and staying physically fit with regular exercise regimen is the only way to keep our vasculature less tonic. Studies have shown that keeping ideal body wt, exercise routine about 140 to 150 minutes a week, eating variety of plant based diet and drinking plentiful water are quite helpful. Monitor BP twice or once a week at home and bring log to be reviewed by me. Uncontrolled BP has long term consequences including heart failure, myocardial infarction, accelerated atherosclerosis and kidney dysfunction. Therapy reviewed and explained.           GERD without esophagitis    Pneumonia of right middle lobe due to infectious organism     Repeat chest x-ray continue MiraLAX         Moderate protein-calorie malnutrition (CMS/HCC)     Pt was seen as a initial visit at skilled care facility after recent hospitalization, pt is feeling weak and in the process of convalescence. Hospitalization data, imaging studies, labs and consultations and DC summary reviewed. Assessed by physical therapy during stay and considered for short term skilled stay. Initial skin assessment reviewed through nursing staff. Catheter or any lines also has been checked and inquired though nursing staff. usually convalescence process is prolonged and pt feels weak and tired. AEMR if applies reviewed. Problem list reviewed and compiled. Discussed with nursing staff.   Skin Integrity:  Nursing to monitor skin integrity as patient is at risk for pressure injuries.  Wound care per nursing    See Facility notes for measurements/assessments  Turn and reposition Q 2 hours or more  Air mattress and when up in chair cushion reducing device  Dietician to evaluate and recommend:  Nutritional supplement:  Please monitor skin integrity and other pressure areas  Referral to wound clinic if appropriate:    PLAN:   Recent nursing evaluation and notes were reviewed.   Overall, patient is  stable despite his/her chronic conditions.    #  Any decline or change in condition needs to be communicated with the physician or myself.    Discussion with nursing staff regarding ongoing care and management.  If needed, would communicate with family who are not present at this time.   There are no concerns at this time.    We will continue with the medications noted above.    We will continue to follow the patient here at the facility.    *Please note that nursing facility, outside laboratory agency, and  AEMR do not interface.     Completion of the note was done through Dragon voice recognition technology and may include   unintended or grammatical errors which may not have been recognized when finalizing the note.     Time:     Corey Atkinson MD        Electronically Signed By: Corey Atkinson MD   9/17/23  2:34 PM

## 2023-09-17 ENCOUNTER — NURSING HOME VISIT (OUTPATIENT)
Dept: POST ACUTE CARE | Facility: EXTERNAL LOCATION | Age: 78
End: 2023-09-17
Payer: MEDICARE

## 2023-09-17 DIAGNOSIS — E11.59 HYPERTENSION ASSOCIATED WITH DIABETES (MULTI): ICD-10-CM

## 2023-09-17 DIAGNOSIS — D63.8 ANEMIA OF CHRONIC DISEASE: ICD-10-CM

## 2023-09-17 DIAGNOSIS — D69.6 THROMBOCYTOPENIA (CMS-HCC): ICD-10-CM

## 2023-09-17 DIAGNOSIS — E11.51 TYPE 2 DIABETES MELLITUS WITH DIABETIC PERIPHERAL ANGIOPATHY WITHOUT GANGRENE, WITHOUT LONG-TERM CURRENT USE OF INSULIN (MULTI): ICD-10-CM

## 2023-09-17 DIAGNOSIS — I15.2 HYPERTENSION ASSOCIATED WITH DIABETES (MULTI): ICD-10-CM

## 2023-09-17 DIAGNOSIS — E22.2 SIADH (SYNDROME OF INAPPROPRIATE ADH PRODUCTION) (MULTI): Primary | ICD-10-CM

## 2023-09-17 DIAGNOSIS — E11.51 TYPE 2 DIABETES MELLITUS WITH DIABETIC PERIPHERAL ANGIOPATHY WITHOUT GANGRENE, UNSPECIFIED WHETHER LONG TERM INSULIN USE (MULTI): ICD-10-CM

## 2023-09-17 PROBLEM — M54.50 CHRONIC MIDLINE LOW BACK PAIN WITHOUT SCIATICA: Status: ACTIVE | Noted: 2023-09-17

## 2023-09-17 PROBLEM — G89.29 CHRONIC MIDLINE LOW BACK PAIN WITHOUT SCIATICA: Status: ACTIVE | Noted: 2023-09-17

## 2023-09-17 PROCEDURE — 99308 SBSQ NF CARE LOW MDM 20: CPT | Performed by: INTERNAL MEDICINE

## 2023-09-17 NOTE — LETTER
Patient: Vahid Monahan  : 1945    Encounter Date: 2023    Name: Vahid Monahan  YOB: 1945    INITIAL NP FOLLOW UP VISIT:Allergies: No Known Allergies   Code Status: CPR    Special Instructions: Skilled services for PT/OT/ST nursing observation and care plan implementation. S/P hosp stay for back, hip pain, past medical hx., OA Lt.hip, Umbilical hernia, atroke, HTN, DM. Monitor for pain, ADL level of participation. Monitor lab work and VS as indicated.   Osteomyelitis of spine aspiration pneumonia PEG tube pancytopenia pressure ulcer weight loss uncontrolled diabetes with complication  Confusion anemia constipation  HPI 78-year-old patient complaining of the constipation confusion weakness poor appetite tingling numbness back pain leg pain  Anemia with thrombocytopenia check iron B12 folic acid  SIADH sodium 131 chloride 92 BUN 41 creatinine 0.6 protein 5.9 alkaline phosphatase 134 cut down water intake hold nephrotoxic medication      Transfer from [facility] to the [facility] admitting diagnosis back pain bilateral sacroiliitis CHF diabetes dysphagia hypertension hyperlipidemia osteomyelitis sacral decubitus ulcer    This is a 78-year-old patient who had a diabetes pancytopenia obesity esophageal stricture s/p dilatation oral candidiasis PEG tube placement brought to the emergency room poor appetite weight loss hyperglycemia blood sugar 400 x-ray showed pneumonia acute renal failure diagnosis of uncontrolled diabetes dehydration aspiration pneumonia weight loss dysphagia PEG tube placement colitis UTI hyponatremia diabetes with complication seen by cardiologist infectious disease endocrinologist gastroenterologist patient was stabilized discharge back to the [facility] azotemia bilateral pulmonary infiltrate chronic hyperglycemia dehydration diabetes oral thrush hypoxia aspiration pneumonia lumbar osteomyelitis protein calorie malnutrition iron deficiency anemia  carpal tunnel CHF hip pain pancytopenia    Patient received IV Diflucan IV Protonix IV iron IV daptomycin and Zosyn PEG tube care Pickard catheter care    REVIEW OF SYSTEMS:  Review of systems are negative except where noted in HPI.    There were no vitals taken for this visit.     Physical Exam   Current Vitals     BP: 107/53 mmHg  9/15/2023 09:11    Temp:97.7 °F  9/15/2023 09:11  Pulse:82 bpm  9/15/2023 09:11  Weight:119.1 Lbs  9/15/2023 11:12  Resp:16 Breaths/min  9/15/2023 09:11  BS:206 mg/dL  9/17/2023 09:38  O2:96 %  9/15/2023 09:11  Pain:0  9/15/2023 09:11  Cachexia of chronic disease    Skin: Pressure ulcer   eyes: PERRLA, EOMs intact,  Conjunctiva pink with no redness or exudates. Cornea & anterior chamber are clear, Eyelids without lesions. No scleral icterus.     ENT: Minimal wax   neck: Supple, without meningismus. Thyroid not palpable. Trachea at midline. No lymphadenopathy.    Pulmonary: Decreased air entry crackles.  No accessory muscle use or stridor.    Cardiac: Heart murmur ankle edema    Abdomen: Active epigastric tenderness o rebound or guarding.  No CVA tenderness.    Genitourinary: Pickard catheter  Musculoskeletal: F arthritis of spine with tenderness   neurological: Dysarthria dysphagia neuropathy  Psychiatric: Appropriate mood and affect.  Anxiety depression      Living will related issues reviewed-CODE STATUS: Review    DISCHARGE PLANNING:  Discharge Home when goals are met.   Follow up with PCP in 1-2 weeks after discharge from facility.   [facility] to follow after discharge for continued PT/OT and Nursing.    RECENT HOSPITALIZATION DATES: [facility]  All laboratories, diagnostic tests, progress notes, and consultation notes reviewed from recent hospitalization.     LABORATORIES OR DIAGNOSTIC TESTS DONE/REVIEWED IN FACILITY: Review    MEDICATIONS REVIEWED AT THE FACILITY:     Aspirin Low Dose Oral Tablet Chewable 81 MG (Aspirin) Active 9/9/2023     Metoprolol Tartrate Oral Tablet 25 MG  (Metoprolol Tartrate) Active 9/8/2023 9/8/2023    Albuterol Sulfate Inhalation Nebulization Solution (2.5 MG/3ML) 0.083% (Albuterol Sulfate) Active 9/8/2023 9/8/2023    Multivitamin-Minerals Oral Tablet (Multiple Vitamins w/ Minerals) Active 9/9/2023     Insulin Glargine Subcutaneous Solution 100 UNIT/ML (Insulin Glargine) Active 9/9/2023 9/8/2023    Losartan Potassium Oral Tablet 25 MG (Losartan Potassium) Active 9/9/2023 9/8/2023    Escitalopram Oxalate Oral Tablet 20 MG (Escitalopram Oxalate) Active 9/9/2023 9/8/2023    traMADol HCl Oral Tablet 50 MG (Tramadol HCl) Active 9/8/2023 9/8/2023    Docusate Sodium Oral Liquid 100 MG/10ML (Docusate Sodium) Active 9/8/2023     metFORMIN HCl Oral Tablet 500 MG (Metformin HCl) Active 9/8/2023 9/8/2023    Polyethylene Glycol 3350 Oral Powder 17 GM/SCOOP (Polyethylene Glycol 3350) Active 9/8/2023     Rosuvastatin Calcium Oral Tablet 5 MG (Rosuvastatin Calcium) Active 9/8/2023 9/8/2023    Insulin Lispro Injection Solution 100 UNIT/ML (Insulin Lispro) Active 9/9/2023 9/9/2023    Magnesium Oxide Oral Tablet 400 MG (Magnesium Oxide) Active 9/10/2023   Assessment/Plan   Problem List Items Addressed This Visit       Type 2 diabetes mellitus with diabetic peripheral angiopathy without gangrene (CMS/HCC)     Diabetes is chronic disease, it does not get cured but it can be controlled, in modern medicine there are variety of measures taken to control DM. Usually we want to preserve beta cell functions. Please understand the disease and how our life style can affect control of glycemia. Diabetes leads to macro and microvascular complications and they could be devastating. It is important to have annual eye check and frequent foot inspection and foot inspection. Kidney dysfunction including dialysis, foot amputations, neuropathy, foot ulcers and accelerated atherosclerotic vascular disease are known complications of uncontrolled DM, pt was educated and explained.           Hypertension  associated with diabetes (CMS/[facility])     Patients BP readings reviewed and addressed, as we age our arteries turn stiffer and less elastic. Restricting salt consumption and staying physically fit with regular exercise regimen is the only way to keep our vasculature less tonic. Studies have shown that keeping ideal body wt, exercise routine about 140 to 150 minutes a week, eating variety of plant based diet and drinking plentiful water are quite helpful. Monitor BP twice or once a week at home and bring log to be reviewed by me. Uncontrolled BP has long term consequences including heart failure, myocardial infarction, accelerated atherosclerosis and kidney dysfunction. Therapy reviewed and explained.           Thrombocytopenia (CMS/[facility])    Anemia of chronic disease    SIADH (syndrome of inappropriate ADH production) (CMS/[facility]) - Primary     Fluid restriction 1500 cc        Rx list reviewed. PT and OT evaluation is in the process. Routine safety measures, fall precautions, risk modification and alarm placement if needed for prevention of falls. Skin care precautions, prevention of pressures sores at pressure points assessed. Pt needs to be monitored frequently by nursing staff particularly at night time. Any confusion, agitation or behavioural disturbance needs to be attended, as per home policy rapid covid Ag assay need to be done, notify if positive. If needed appropriate measures to be taken for alarm placements and assisted devices, pt was told not to get up and ambulate at night unless help and assist available at bedside, labs will be done as per our routine protocol. PO intake need to be monitored if consuming po.   Skin Integrity:  Nursing to monitor skin integrity as patient is at risk for pressure injuries.  Wound care per nursing    See Facility notes for measurements/assessments  Turn and reposition Q 2 hours or more  Air mattress and when up in chair cushion reducing device  Dietician to evaluate and  recommend:  Nutritional supplement:  Please monitor skin integrity and other pressure areas  Referral to wound clinic if appropriate:    PLAN:   Recent nursing evaluation and notes were reviewed.   Overall, patient is stable despite his/her chronic conditions.    #  Any decline or change in condition needs to be communicated with the physician or myself.    Discussion with nursing staff regarding ongoing care and management.  If needed, would communicate with family who are not present at this time.   There are no concerns at this time.    We will continue with the medications noted above.    We will continue to follow the patient here at the facility.    *Please note that nursing facility, outside laboratory agency, and  AEMR do not interface.     Completion of the note was done through Dragon voice recognition technology and may include   unintended or grammatical errors which may not have been recognized when finalizing the note.     Time:     Corey Atkinson MD        Electronically Signed By: Corey Atkinson MD   9/17/23  2:39 PM

## 2023-09-17 NOTE — PROGRESS NOTES
Name: Vahid Monahan  YOB: 1945    INITIAL NP FOLLOW UP VISIT:Allergies: No Known Allergies   Code Status: CPR    Special Instructions: Skilled services for PT/OT/ST nursing observation and care plan implementation. S/P hosp stay for back, hip pain, past medical hx., OA Lt.hip, Umbilical hernia, atroke, HTN, DM. Monitor for pain, ADL level of participation. Monitor lab work and VS as indicated.   Osteomyelitis of spine aspiration pneumonia PEG tube pancytopenia pressure ulcer weight loss uncontrolled diabetes with complication    HPI Transfer from [facility] to the [facility] admitting diagnosis back pain bilateral sacroiliitis CHF diabetes dysphagia hypertension hyperlipidemia osteomyelitis sacral decubitus ulcer    This is a 78-year-old patient who had a diabetes pancytopenia obesity esophageal stricture s/p dilatation oral candidiasis PEG tube placement brought to the emergency room poor appetite weight loss hyperglycemia blood sugar 400 x-ray showed pneumonia acute renal failure diagnosis of uncontrolled diabetes dehydration aspiration pneumonia weight loss dysphagia PEG tube placement colitis UTI hyponatremia diabetes with complication seen by cardiologist infectious disease endocrinologist gastroenterologist patient was stabilized discharge back to the [facility] azotemia bilateral pulmonary infiltrate chronic hyperglycemia dehydration diabetes oral thrush hypoxia aspiration pneumonia lumbar osteomyelitis protein calorie malnutrition iron deficiency anemia carpal tunnel CHF hip pain pancytopenia    Patient received IV Diflucan IV Protonix IV iron IV daptomycin and Zosyn PEG tube care Pickard catheter care    REVIEW OF SYSTEMS:  Review of systems are negative except where noted in HPI.    There were no vitals taken for this visit.     Physical Exam   Blood pressure 118/76 heart rate 76 respiratory 18 temperature 98.4 oxygen saturation    Cachexia of chronic disease    Skin:  Pressure ulcer   eyes: PERRLA, EOMs intact,  Conjunctiva pink with no redness or exudates. Cornea & anterior chamber are clear, Eyelids without lesions. No scleral icterus.     ENT: Hearing grossly intact. External auditory canals patent, tympanic membranes intact with visible landmarks. Nares patent, mucus membranes moist. Dentition without lesions. Pharynx clear, uvula midline.     Neck: Supple, without meningismus. Thyroid not palpable. Trachea at midline. No lymphadenopathy.    Pulmonary: Decreased air entry crackles.  No accessory muscle use or stridor.    Cardiac: Heart murmur ankle edema    Abdomen: Active epigastric tenderness o rebound or guarding.  No CVA tenderness.    Genitourinary: Pickard catheter  Musculoskeletal: F arthritis of spine with tenderness   neurological: Dysarthria dysphagia neuropathy  Psychiatric: Appropriate mood and affect.  Anxiety depression      Living will related issues reviewed-CODE STATUS: Review    DISCHARGE PLANNING:  Discharge Home when goals are met.   Follow up with PCP in 1-2 weeks after discharge from facility.   C to follow after discharge for continued PT/OT and Nursing.    RECENT HOSPITALIZATION DATES: [facility]  All laboratories, diagnostic tests, progress notes, and consultation notes reviewed from recent hospitalization.     LABORATORIES OR DIAGNOSTIC TESTS DONE/REVIEWED IN FACILITY: Review    MEDICATIONS REVIEWED AT THE FACILITY:     Aspirin Low Dose Oral Tablet Chewable 81 MG (Aspirin) Active 9/9/2023     Metoprolol Tartrate Oral Tablet 25 MG (Metoprolol Tartrate) Active 9/8/2023 9/8/2023    Albuterol Sulfate Inhalation Nebulization Solution (2.5 MG/3ML) 0.083% (Albuterol Sulfate) Active 9/8/2023 9/8/2023    Multivitamin-Minerals Oral Tablet (Multiple Vitamins w/ Minerals) Active 9/9/2023     Insulin Glargine Subcutaneous Solution 100 UNIT/ML (Insulin Glargine) Active 9/9/2023 9/8/2023    Losartan Potassium Oral Tablet 25 MG (Losartan  Potassium) Active 9/9/2023 9/8/2023    Escitalopram Oxalate Oral Tablet 20 MG (Escitalopram Oxalate) Active 9/9/2023 9/8/2023    traMADol HCl Oral Tablet 50 MG (Tramadol HCl) Active 9/8/2023 9/8/2023    Docusate Sodium Oral Liquid 100 MG/10ML (Docusate Sodium) Active 9/8/2023     metFORMIN HCl Oral Tablet 500 MG (Metformin HCl) Active 9/8/2023 9/8/2023    Polyethylene Glycol 3350 Oral Powder 17 GM/SCOOP (Polyethylene Glycol 3350) Active 9/8/2023     Rosuvastatin Calcium Oral Tablet 5 MG (Rosuvastatin Calcium) Active 9/8/2023 9/8/2023    Insulin Lispro Injection Solution 100 UNIT/ML (Insulin Lispro) Active 9/9/2023 9/9/2023    Magnesium Oxide Oral Tablet 400 MG (Magnesium Oxide) Active 9/10/2023   Assessment/Plan    Problem List Items Addressed This Visit       Osteomyelitis (CMS/HCC)     Infectious disease spine specialist to follow-up continue antibiotic probiotic         Pancytopenia (CMS/HCC)     Repeat CBC with differential iron B12 folic acid reticulocyte count         Type 2 diabetes mellitus with diabetic peripheral angiopathy without gangrene (CMS/[facility]) - Primary     Diabetes is chronic disease, it does not get cured but it can be controlled, in modern medicine there are variety of measures taken to control DM. Usually we want to preserve beta cell functions. Please understand the disease and how our life style can affect control of glycemia. Diabetes leads to macro and microvascular complications and they could be devastating. It is important to have annual eye check and frequent foot inspection and foot inspection. Kidney dysfunction including dialysis, foot amputations, neuropathy, foot ulcers and accelerated atherosclerotic vascular disease are known complications of uncontrolled DM, pt was educated and explained.           Hypertension associated with diabetes (CMS/HCC)     Patients BP readings reviewed and addressed, as we age our arteries turn stiffer and less elastic. Restricting salt consumption and  staying physically fit with regular exercise regimen is the only way to keep our vasculature less tonic. Studies have shown that keeping ideal body wt, exercise routine about 140 to 150 minutes a week, eating variety of plant based diet and drinking plentiful water are quite helpful. Monitor BP twice or once a week at home and bring log to be reviewed by me. Uncontrolled BP has long term consequences including heart failure, myocardial infarction, accelerated atherosclerosis and kidney dysfunction. Therapy reviewed and explained.           GERD without esophagitis    Pneumonia of right middle lobe due to infectious organism     Repeat chest x-ray continue MiraLAX         Moderate protein-calorie malnutrition (CMS/HCC)     Pt was seen as a initial visit at skilled care facility after recent hospitalization, pt is feeling weak and in the process of convalescence. Hospitalization data, imaging studies, labs and consultations and DC summary reviewed. Assessed by physical therapy during stay and considered for short term skilled stay. Initial skin assessment reviewed through nursing staff. Catheter or any lines also has been checked and inquired though nursing staff. usually convalescence process is prolonged and pt feels weak and tired. [facility]EM[facility] if applies reviewed. Problem list reviewed and compiled. Discussed with nursing staff.   Skin Integrity:  Nursing to monitor skin integrity as patient is at risk for pressure injuries.  Wound care per nursing    See Facility notes for measurements/assessments  Turn and reposition Q 2 hours or more  Air mattress and when up in chair cushion reducing device  Dietician to evaluate and recommend:  Nutritional supplement:  Please monitor skin integrity and other pressure areas  Referral to wound clinic if appropriate:    PLAN:   Recent nursing evaluation and notes were reviewed.   Overall, patient is stable despite his/her chronic conditions.    #  Any decline or change in condition  needs to be communicated with the physician or myself.    Discussion with nursing staff regarding ongoing care and management.  If needed, would communicate with family who are not present at this time.   There are no concerns at this time.    We will continue with the medications noted above.    We will continue to follow the patient here at the facility.    *Please note that nursing facility, outside laboratory agency, and  [facility] do not interface.     Completion of the note was done through Dragon voice recognition technology and may include   unintended or grammatical errors which may not have been recognized when finalizing the note.     Time:     Corey Atkinson MD

## 2023-09-17 NOTE — PROGRESS NOTES
Name: Vahid Monahan  YOB: 1945    INITIAL NP FOLLOW UP VISIT:Allergies: No Known Allergies   Code Status: CPR    Special Instructions: Skilled services for PT/OT/ST nursing observation and care plan implementation. S/P hosp stay for back, hip pain, past medical hx., OA Lt.hip, Umbilical hernia, atroke, HTN, DM. Monitor for pain, ADL level of participation. Monitor lab work and VS as indicated.   Osteomyelitis of spine aspiration pneumonia PEG tube pancytopenia pressure ulcer weight loss uncontrolled diabetes with complication  Confusion anemia constipation  HPI 78-year-old patient complaining of the constipation confusion weakness poor appetite tingling numbness back pain leg pain  Anemia with thrombocytopenia check iron B12 folic acid  SIADH sodium 131 chloride 92 BUN 41 creatinine 0.6 protein 5.9 alkaline phosphatase 134 cut down water intake hold nephrotoxic medication      Transfer from [facility] to the [facility] admitting diagnosis back pain bilateral sacroiliitis CHF diabetes dysphagia hypertension hyperlipidemia osteomyelitis sacral decubitus ulcer    This is a 78-year-old patient who had a diabetes pancytopenia obesity esophageal stricture s/p dilatation oral candidiasis PEG tube placement brought to the emergency room poor appetite weight loss hyperglycemia blood sugar 400 x-ray showed pneumonia acute renal failure diagnosis of uncontrolled diabetes dehydration aspiration pneumonia weight loss dysphagia PEG tube placement colitis UTI hyponatremia diabetes with complication seen by cardiologist infectious disease endocrinologist gastroenterologist patient was stabilized discharge back to the [facility] azotemia bilateral pulmonary infiltrate chronic hyperglycemia dehydration diabetes oral thrush hypoxia aspiration pneumonia lumbar osteomyelitis protein calorie malnutrition iron deficiency anemia carpal tunnel CHF hip pain pancytopenia    Patient received IV Diflucan IV  Protonix IV iron IV daptomycin and Zosyn PEG tube care Pickard catheter care    REVIEW OF SYSTEMS:  Review of systems are negative except where noted in HPI.    There were no vitals taken for this visit.     Physical Exam   Current Vitals     BP: 107/53 mmHg  9/15/2023 09:11    Temp:97.7 °F  9/15/2023 09:11  Pulse:82 bpm  9/15/2023 09:11  Weight:119.1 Lbs  9/15/2023 11:12  Resp:16 Breaths/min  9/15/2023 09:11  BS:206 mg/dL  9/17/2023 09:38  O2:96 %  9/15/2023 09:11  Pain:0  9/15/2023 09:11  Cachexia of chronic disease    Skin: Pressure ulcer   eyes: PERRLA, EOMs intact,  Conjunctiva pink with no redness or exudates. Cornea & anterior chamber are clear, Eyelids without lesions. No scleral icterus.     ENT: Minimal wax   neck: Supple, without meningismus. Thyroid not palpable. Trachea at midline. No lymphadenopathy.    Pulmonary: Decreased air entry crackles.  No accessory muscle use or stridor.    Cardiac: Heart murmur ankle edema    Abdomen: Active epigastric tenderness o rebound or guarding.  No CVA tenderness.    Genitourinary: Pickard catheter  Musculoskeletal: F arthritis of spine with tenderness   neurological: Dysarthria dysphagia neuropathy  Psychiatric: Appropriate mood and affect.  Anxiety depression      Living will related issues reviewed-CODE STATUS: Review    DISCHARGE PLANNING:  Discharge Home when goals are met.   Follow up with PCP in 1-2 weeks after discharge from facility.   C to follow after discharge for continued PT/OT and Nursing.    RECENT HOSPITALIZATION DATES: [facility]  All laboratories, diagnostic tests, progress notes, and consultation notes reviewed from recent hospitalization.     LABORATORIES OR DIAGNOSTIC TESTS DONE/REVIEWED IN FACILITY: Review    MEDICATIONS REVIEWED AT THE FACILITY:     Aspirin Low Dose Oral Tablet Chewable 81 MG (Aspirin) Active 9/9/2023     Metoprolol Tartrate Oral Tablet 25 MG (Metoprolol Tartrate) Active 9/8/2023 9/8/2023    Albuterol Sulfate Inhalation  Nebulization Solution (2.5 MG/3ML) 0.083% (Albuterol Sulfate) Active 9/8/2023 9/8/2023    Multivitamin-Minerals Oral Tablet (Multiple Vitamins w/ Minerals) Active 9/9/2023     Insulin Glargine Subcutaneous Solution 100 UNIT/ML (Insulin Glargine) Active 9/9/2023 9/8/2023    Losartan Potassium Oral Tablet 25 MG (Losartan Potassium) Active 9/9/2023 9/8/2023    Escitalopram Oxalate Oral Tablet 20 MG (Escitalopram Oxalate) Active 9/9/2023 9/8/2023    traMADol HCl Oral Tablet 50 MG (Tramadol HCl) Active 9/8/2023 9/8/2023    Docusate Sodium Oral Liquid 100 MG/10ML (Docusate Sodium) Active 9/8/2023     metFORMIN HCl Oral Tablet 500 MG (Metformin HCl) Active 9/8/2023 9/8/2023    Polyethylene Glycol 3350 Oral Powder 17 GM/SCOOP (Polyethylene Glycol 3350) Active 9/8/2023     Rosuvastatin Calcium Oral Tablet 5 MG (Rosuvastatin Calcium) Active 9/8/2023 9/8/2023    Insulin Lispro Injection Solution 100 UNIT/ML (Insulin Lispro) Active 9/9/2023 9/9/2023    Magnesium Oxide Oral Tablet 400 MG (Magnesium Oxide) Active 9/10/2023   Assessment/Plan    Problem List Items Addressed This Visit       Type 2 diabetes mellitus with diabetic peripheral angiopathy without gangrene (CMS/HCC)     Diabetes is chronic disease, it does not get cured but it can be controlled, in modern medicine there are variety of measures taken to control DM. Usually we want to preserve beta cell functions. Please understand the disease and how our life style can affect control of glycemia. Diabetes leads to macro and microvascular complications and they could be devastating. It is important to have annual eye check and frequent foot inspection and foot inspection. Kidney dysfunction including dialysis, foot amputations, neuropathy, foot ulcers and accelerated atherosclerotic vascular disease are known complications of uncontrolled DM, pt was educated and explained.           Hypertension associated with diabetes (CMS/HCC)     Patients BP readings reviewed and  addressed, as we age our arteries turn stiffer and less elastic. Restricting salt consumption and staying physically fit with regular exercise regimen is the only way to keep our vasculature less tonic. Studies have shown that keeping ideal body wt, exercise routine about 140 to 150 minutes a week, eating variety of plant based diet and drinking plentiful water are quite helpful. Monitor BP twice or once a week at home and bring log to be reviewed by me. Uncontrolled BP has long term consequences including heart failure, myocardial infarction, accelerated atherosclerosis and kidney dysfunction. Therapy reviewed and explained.           Thrombocytopenia (CMS/HCC)    Anemia of chronic disease    SIADH (syndrome of inappropriate ADH production) (CMS/[facility]) - Primary     Fluid restriction 1500 cc        Rx list reviewed. PT and OT evaluation is in the process. Routine safety measures, fall precautions, risk modification and alarm placement if needed for prevention of falls. Skin care precautions, prevention of pressures sores at pressure points assessed. Pt needs to be monitored frequently by nursing staff particularly at night time. Any confusion, agitation or behavioural disturbance needs to be attended, as per home policy rapid covid Ag assay need to be done, notify if positive. If needed appropriate measures to be taken for alarm placements and assisted devices, pt was told not to get up and ambulate at night unless help and assist available at bedside, labs will be done as per our routine protocol. PO intake need to be monitored if consuming po.   Skin Integrity:  Nursing to monitor skin integrity as patient is at risk for pressure injuries.  Wound care per nursing    See Facility notes for measurements/assessments  Turn and reposition Q 2 hours or more  Air mattress and when up in chair cushion reducing device  Dietician to evaluate and recommend:  Nutritional supplement:  Please monitor skin integrity and other  pressure areas  Referral to wound clinic if appropriate:    PLAN:   Recent nursing evaluation and notes were reviewed.   Overall, patient is stable despite his/her chronic conditions.    #  Any decline or change in condition needs to be communicated with the physician or myself.    Discussion with nursing staff regarding ongoing care and management.  If needed, would communicate with family who are not present at this time.   There are no concerns at this time.    We will continue with the medications noted above.    We will continue to follow the patient here at the facility.    *Please note that nursing facility, outside laboratory agency, and [facility] do not interface.     Completion of the note was done through Dragon voice recognition technology and may include   unintended or grammatical errors which may not have been recognized when finalizing the note.     Time:     Corey Atkinson MD

## 2023-09-27 ENCOUNTER — NURSING HOME VISIT (OUTPATIENT)
Dept: POST ACUTE CARE | Facility: EXTERNAL LOCATION | Age: 78
End: 2023-09-27
Payer: MEDICARE

## 2023-09-27 DIAGNOSIS — D61.818 PANCYTOPENIA (MULTI): Primary | ICD-10-CM

## 2023-09-27 DIAGNOSIS — E22.2 SIADH (SYNDROME OF INAPPROPRIATE ADH PRODUCTION) (MULTI): ICD-10-CM

## 2023-09-27 DIAGNOSIS — E11.51 TYPE 2 DIABETES MELLITUS WITH DIABETIC PERIPHERAL ANGIOPATHY WITHOUT GANGRENE, WITHOUT LONG-TERM CURRENT USE OF INSULIN (MULTI): ICD-10-CM

## 2023-09-27 DIAGNOSIS — E11.51 TYPE 2 DIABETES MELLITUS WITH DIABETIC PERIPHERAL ANGIOPATHY WITHOUT GANGRENE, UNSPECIFIED WHETHER LONG TERM INSULIN USE (MULTI): ICD-10-CM

## 2023-09-27 DIAGNOSIS — K21.9 GERD WITHOUT ESOPHAGITIS: ICD-10-CM

## 2023-09-27 DIAGNOSIS — E44.0 MODERATE PROTEIN-CALORIE MALNUTRITION (MULTI): ICD-10-CM

## 2023-09-27 PROCEDURE — 99308 SBSQ NF CARE LOW MDM 20: CPT | Performed by: INTERNAL MEDICINE

## 2023-09-27 NOTE — LETTER
Patient: Vahid Monahan  : 1945    Encounter Date: 2023    Name: Vahid Monahan  YOB: 1945    INITIAL NP FOLLOW UP VISIT:Allergies: No Known Allergies   Code Status: CPR    Special Instructions: Skilled services for PT/OT/ST nursing observation and care plan implementation. S/P hosp stay for back, hip pain, past medical hx., OA Lt.hip, Umbilical hernia, atroke, HTN, DM. Monitor for pain, ADL level of participation. Monitor lab work and VS as indicated.   Osteomyelitis of spine aspiration pneumonia PEG tube pancytopenia pressure ulcer weight loss uncontrolled diabetes with complication  Confusion anemia constipation78-year-old patient evaluated for complex medical problem leukopenia anemia WBC 3.5 H&H 8.5 and 26.6 platelet 101 sed rate 54    Kidney disease with SIADH sodium 131 BUN 34 protein calorie malnutrition iron deficiency anemia iron is 32 CRP 1.2 alkaline phosphatase 129 protein 5.9 glucose 139  HPI     78-year-old patient complaining of the constipation confusion weakness poor appetite tingling numbness back pain leg pain  Anemia with thrombocytopenia check iron B12 folic acid  SIADH sodium 131 chloride 92 BUN 41 creatinine 0.6 protein 5.9 alkaline phosphatase 134 cut down water intake hold nephrotoxic medication      Transfer from [facility] to the [facility] admitting diagnosis back pain bilateral sacroiliitis CHF diabetes dysphagia hypertension hyperlipidemia osteomyelitis sacral decubitus ulcer    This is a 78-year-old patient who had a diabetes pancytopenia obesity esophageal stricture s/p dilatation oral candidiasis PEG tube placement brought to the emergency room poor appetite weight loss hyperglycemia blood sugar 400 x-ray showed pneumonia acute renal failure diagnosis of uncontrolled diabetes dehydration aspiration pneumonia weight loss dysphagia PEG tube placement colitis UTI hyponatremia diabetes with complication seen by cardiologist  infectious disease endocrinologist gastroenterologist patient was stabilized discharge back to the E[facility] azotemia bilateral pulmonary infiltrate chronic hyperglycemia dehydration diabetes oral thrush hypoxia aspiration pneumonia lumbar osteomyelitis protein calorie malnutrition iron deficiency anemia carpal tunnel CHF hip pain pancytopenia    Patient received IV Diflucan IV Protonix IV iron IV daptomycin and Zosyn PEG tube care Pickard catheter care    REVIEW OF SYSTEMS:  Review of systems are negative except where noted in HPI.    There were no vitals taken for this visit.     Physical Exam   Current Vitals     BP: 87/49 mmHg  9/27/2023 08:12  Temp:97.8 °F  9/27/2023 08:12  Pulse:72 bpm  9/27/2023 08:12  Weight:121.6 Lbs  9/25/2023 17:52  Resp:16 Breaths/min  9/27/2023 08:12  BS:137 mg/dL  9/27/2023 14:23  O2:96 %  9/27/2023 08:12  Pain:0  9/27/2023 15:16    Cachexia of chronic disease    Skin: Pressure ulcer   eyes: PERRLA, EOMs intact,  Conjunctiva pink with no redness or exudates. Cornea & anterior chamber are clear, Eyelids without lesions. No scleral icterus.     ENT: Minimal wax   neck: Supple, without meningismus. Thyroid not palpable. Trachea at midline. No lymphadenopathy.    Pulmonary: Decreased air entry crackles.  No accessory muscle use or stridor.    Cardiac: Heart murmur ankle edema    Abdomen: Active epigastric tenderness o rebound or guarding.  No CVA tenderness.    Genitourinary: Pickard catheter  Musculoskeletal: F arthritis of spine with tenderness   neurological: Dysarthria dysphagia neuropathy  Psychiatric: Appropriate mood and affect.  Anxiety depression      Living will related issues reviewed-CODE STATUS: Review    DISCHARGE PLANNING:  Discharge Home when goals are met.   Follow up with PCP in 1-2 weeks after discharge from facility.   C to follow after discharge for continued PT/OT and Nursing.    RECENT HOSPITALIZATION DATES: [facility]  All laboratories, diagnostic tests, progress  notes, and consultation notes reviewed from recent hospitalization.     LABORATORIES OR DIAGNOSTIC TESTS DONE/REVIEWED IN FACILITY: Review    MEDICATIONS REVIEWED AT THE FACILITY:     Aspirin Low Dose Oral Tablet Chewable 81 MG (Aspirin) Active 9/9/2023     Metoprolol Tartrate Oral Tablet 25 MG (Metoprolol Tartrate) Active 9/8/2023 9/8/2023    Albuterol Sulfate Inhalation Nebulization Solution (2.5 MG/3ML) 0.083% (Albuterol Sulfate) Active 9/8/2023 9/8/2023    Multivitamin-Minerals Oral Tablet (Multiple Vitamins w/ Minerals) Active 9/9/2023     Insulin Glargine Subcutaneous Solution 100 UNIT/ML (Insulin Glargine) Active 9/9/2023 9/8/2023    Losartan Potassium Oral Tablet 25 MG (Losartan Potassium) Active 9/9/2023 9/8/2023    Escitalopram Oxalate Oral Tablet 20 MG (Escitalopram Oxalate) Active 9/9/2023 9/8/2023    traMADol HCl Oral Tablet 50 MG (Tramadol HCl) Active 9/8/2023 9/8/2023    Docusate Sodium Oral Liquid 100 MG/10ML (Docusate Sodium) Active 9/8/2023     metFORMIN HCl Oral Tablet 500 MG (Metformin HCl) Active 9/8/2023 9/8/2023    Polyethylene Glycol 3350 Oral Powder 17 GM/SCOOP (Polyethylene Glycol 3350) Active 9/8/2023     Rosuvastatin Calcium Oral Tablet 5 MG (Rosuvastatin Calcium) Active 9/8/2023 9/8/2023    Insulin Lispro Injection Solution 100 UNIT/ML (Insulin Lispro) Active 9/9/2023 9/9/2023    Magnesium Oxide Oral Tablet 400 MG (Magnesium Oxide) Active 9/10/2023   Assessment/Plan   Problem List Items Addressed This Visit       Pancytopenia (CMS/HCC) - Primary     Check iron B12 folic acid reticulocyte count refer patient to hematologist advised to supply Procrit iron B12 folic acid rule out MDS         Type 2 diabetes mellitus with diabetic peripheral angiopathy without gangrene (CMS/HCC)     Diabetes is chronic disease, it does not get cured but it can be controlled, in modern medicine there are variety of measures taken to control DM. Usually we want to preserve beta cell functions. Please  understand the disease and how our life style can affect control of glycemia. Diabetes leads to macro and microvascular complications and they could be devastating. It is important to have annual eye check and frequent foot inspection and foot inspection. Kidney dysfunction including dialysis, foot amputations, neuropathy, foot ulcers and accelerated atherosclerotic vascular disease are known complications of uncontrolled DM, pt was educated and explained.           GERD without esophagitis    Moderate protein-calorie malnutrition (CMS/HCC)     Dietitian evaluation         SIADH (syndrome of inappropriate ADH production) (CMS/[facility])     Check sodium potassium cortisol level        Rx list reviewed. PT and OT evaluation is in the process. Routine safety measures, fall precautions, risk modification and alarm placement if needed for prevention of falls. Skin care precautions, prevention of pressures sores at pressure points assessed. Pt needs to be monitored frequently by nursing staff particularly at night time. Any confusion, agitation or behavioural disturbance needs to be attended, as per home policy rapid covid Ag assay need to be done, notify if positive. If needed appropriate measures to be taken for alarm placements and assisted devices, pt was told not to get up and ambulate at night unless help and assist available at bedside, labs will be done as per our routine protocol. PO intake need to be monitored if consuming po.   Skin Integrity:  Nursing to monitor skin integrity as patient is at risk for pressure injuries.  Wound care per nursing    See Facility notes for measurements/assessments  Turn and reposition Q 2 hours or more  Air mattress and when up in chair cushion reducing device  Dietician to evaluate and recommend:  Nutritional supplement:  Please monitor skin integrity and other pressure areas  Referral to wound clinic if appropriate:    PLAN:   Recent nursing evaluation and notes were reviewed.    Overall, patient is stable despite his/her chronic conditions.    #  Any decline or change in condition needs to be communicated with the physician or myself.    Discussion with nursing staff regarding ongoing care and management.  If needed, would communicate with family who are not present at this time.   There are no concerns at this time.    We will continue with the medications noted above.    We will continue to follow the patient here at the facility.    *Please note that nursing facility, outside laboratory agency, and  [facility] do not interface.     Completion of the note was done through Dragon voice recognition technology and may include   unintended or grammatical errors which may not have been recognized when finalizing the note.     Time:     Corey Atkinson MD        Electronically Signed By: Corey Atkinson MD   9/27/23  4:00 PM

## 2023-09-27 NOTE — ASSESSMENT & PLAN NOTE
Check iron B12 folic acid reticulocyte count refer patient to hematologist advised to supply Procrit iron B12 folic acid rule out MDS

## 2023-09-27 NOTE — PROGRESS NOTES
Name: Vahid Monahan  YOB: 1945    INITIAL NP FOLLOW UP VISIT:Allergies: No Known Allergies   Code Status: CPR    Special Instructions: Skilled services for PT/OT/ST nursing observation and care plan implementation. S/P hosp stay for back, hip pain, past medical hx., OA Lt.hip, Umbilical hernia, atroke, HTN, DM. Monitor for pain, ADL level of participation. Monitor lab work and VS as indicated.   Osteomyelitis of spine aspiration pneumonia PEG tube pancytopenia pressure ulcer weight loss uncontrolled diabetes with complication  Confusion anemia constipation78-year-old patient evaluated for complex medical problem leukopenia anemia WBC 3.5 H&H 8.5 and 26.6 platelet 101 sed rate 54    Kidney disease with SIADH sodium 131 BUN 34 protein calorie malnutrition iron deficiency anemia iron is 32 CRP 1.2 alkaline phosphatase 129 protein 5.9 glucose 139  HPI     78-year-old patient complaining of the constipation confusion weakness poor appetite tingling numbness back pain leg pain  Anemia with thrombocytopenia check iron B12 folic acid  SIADH sodium 131 chloride 92 BUN 41 creatinine 0.6 protein 5.9 alkaline phosphatase 134 cut down water intake hold nephrotoxic medication      Transfer from [facility] to the [facility] admitting diagnosis back pain bilateral sacroiliitis CHF diabetes dysphagia hypertension hyperlipidemia osteomyelitis sacral decubitus ulcer    This is a 78-year-old patient who had a diabetes pancytopenia obesity esophageal stricture s/p dilatation oral candidiasis PEG tube placement brought to the emergency room poor appetite weight loss hyperglycemia blood sugar 400 x-ray showed pneumonia acute renal failure diagnosis of uncontrolled diabetes dehydration aspiration pneumonia weight loss dysphagia PEG tube placement colitis UTI hyponatremia diabetes with complication seen by cardiologist infectious disease endocrinologist gastroenterologist patient was stabilized  discharge back to the F azotemia bilateral pulmonary infiltrate chronic hyperglycemia dehydration diabetes oral thrush hypoxia aspiration pneumonia lumbar osteomyelitis protein calorie malnutrition iron deficiency anemia carpal tunnel CHF hip pain pancytopenia    Patient received IV Diflucan IV Protonix IV iron IV daptomycin and Zosyn PEG tube care Pickard catheter care    REVIEW OF SYSTEMS:  Review of systems are negative except where noted in HPI.    There were no vitals taken for this visit.     Physical Exam   Current Vitals     BP: 87/49 mmHg  9/27/2023 08:12  Temp:97.8 °F  9/27/2023 08:12  Pulse:72 bpm  9/27/2023 08:12  Weight:121.6 Lbs  9/25/2023 17:52  Resp:16 Breaths/min  9/27/2023 08:12  BS:137 mg/dL  9/27/2023 14:23  O2:96 %  9/27/2023 08:12  Pain:0  9/27/2023 15:16    Cachexia of chronic disease    Skin: Pressure ulcer   eyes: PERRLA, EOMs intact,  Conjunctiva pink with no redness or exudates. Cornea & anterior chamber are clear, Eyelids without lesions. No scleral icterus.     ENT: Minimal wax   neck: Supple, without meningismus. Thyroid not palpable. Trachea at midline. No lymphadenopathy.    Pulmonary: Decreased air entry crackles.  No accessory muscle use or stridor.    Cardiac: Heart murmur ankle edema    Abdomen: Active epigastric tenderness o rebound or guarding.  No CVA tenderness.    Genitourinary: Pickard catheter  Musculoskeletal: F arthritis of spine with tenderness   neurological: Dysarthria dysphagia neuropathy  Psychiatric: Appropriate mood and affect.  Anxiety depression      Living will related issues reviewed-CODE STATUS: Review    DISCHARGE PLANNING:  Discharge Home when goals are met.   Follow up with PCP in 1-2 weeks after discharge from facility.   HHC to follow after discharge for continued PT/OT and Nursing.    RECENT HOSPITALIZATION DATES: [facility]  All laboratories, diagnostic tests, progress notes, and consultation notes reviewed from recent hospitalization.      LABORATORIES OR DIAGNOSTIC TESTS DONE/REVIEWED IN FACILITY: Review    MEDICATIONS REVIEWED AT THE FACILITY:     Aspirin Low Dose Oral Tablet Chewable 81 MG (Aspirin) Active 9/9/2023     Metoprolol Tartrate Oral Tablet 25 MG (Metoprolol Tartrate) Active 9/8/2023 9/8/2023    Albuterol Sulfate Inhalation Nebulization Solution (2.5 MG/3ML) 0.083% (Albuterol Sulfate) Active 9/8/2023 9/8/2023    Multivitamin-Minerals Oral Tablet (Multiple Vitamins w/ Minerals) Active 9/9/2023     Insulin Glargine Subcutaneous Solution 100 UNIT/ML (Insulin Glargine) Active 9/9/2023 9/8/2023    Losartan Potassium Oral Tablet 25 MG (Losartan Potassium) Active 9/9/2023 9/8/2023    Escitalopram Oxalate Oral Tablet 20 MG (Escitalopram Oxalate) Active 9/9/2023 9/8/2023    traMADol HCl Oral Tablet 50 MG (Tramadol HCl) Active 9/8/2023 9/8/2023    Docusate Sodium Oral Liquid 100 MG/10ML (Docusate Sodium) Active 9/8/2023     metFORMIN HCl Oral Tablet 500 MG (Metformin HCl) Active 9/8/2023 9/8/2023    Polyethylene Glycol 3350 Oral Powder 17 GM/SCOOP (Polyethylene Glycol 3350) Active 9/8/2023     Rosuvastatin Calcium Oral Tablet 5 MG (Rosuvastatin Calcium) Active 9/8/2023 9/8/2023    Insulin Lispro Injection Solution 100 UNIT/ML (Insulin Lispro) Active 9/9/2023 9/9/2023    Magnesium Oxide Oral Tablet 400 MG (Magnesium Oxide) Active 9/10/2023   Assessment/Plan    Problem List Items Addressed This Visit       Pancytopenia (CMS/HCC) - Primary     Check iron B12 folic acid reticulocyte count refer patient to hematologist advised to supply Procrit iron B12 folic acid rule out MDS         Type 2 diabetes mellitus with diabetic peripheral angiopathy without gangrene (CMS/HCC)     Diabetes is chronic disease, it does not get cured but it can be controlled, in modern medicine there are variety of measures taken to control DM. Usually we want to preserve beta cell functions. Please understand the disease and how our life style can affect control of  glycemia. Diabetes leads to macro and microvascular complications and they could be devastating. It is important to have annual eye check and frequent foot inspection and foot inspection. Kidney dysfunction including dialysis, foot amputations, neuropathy, foot ulcers and accelerated atherosclerotic vascular disease are known complications of uncontrolled DM, pt was educated and explained.           GERD without esophagitis    Moderate protein-calorie malnutrition (CMS/[facility])     Dietitian evaluation         SIADH (syndrome of inappropriate ADH production) (CMS/[facility])     Check sodium potassium cortisol level        Rx list reviewed. PT and OT evaluation is in the process. Routine safety measures, fall precautions, risk modification and alarm placement if needed for prevention of falls. Skin care precautions, prevention of pressures sores at pressure points assessed. Pt needs to be monitored frequently by nursing staff particularly at night time. Any confusion, agitation or behavioural disturbance needs to be attended, as per home policy rapid covid Ag assay need to be done, notify if positive. If needed appropriate measures to be taken for alarm placements and assisted devices, pt was told not to get up and ambulate at night unless help and assist available at bedside, labs will be done as per our routine protocol. PO intake need to be monitored if consuming po.   Skin Integrity:  Nursing to monitor skin integrity as patient is at risk for pressure injuries.  Wound care per nursing    See Facility notes for measurements/assessments  Turn and reposition Q 2 hours or more  Air mattress and when up in chair cushion reducing device  Dietician to evaluate and recommend:  Nutritional supplement:  Please monitor skin integrity and other pressure areas  Referral to wound clinic if appropriate:    PLAN:   Recent nursing evaluation and notes were reviewed.   Overall, patient is stable despite his/her chronic conditions.     #  Any decline or change in condition needs to be communicated with the physician or myself.    Discussion with nursing staff regarding ongoing care and management.  If needed, would communicate with family who are not present at this time.   There are no concerns at this time.    We will continue with the medications noted above.    We will continue to follow the patient here at the facility.    *Please note that nursing facility, outside laboratory agency, and  [facility] do not interface.     Completion of the note was done through Dragon voice recognition technology and may include   unintended or grammatical errors which may not have been recognized when finalizing the note.     Time:     Corey Atkinson MD

## 2023-10-29 PROCEDURE — 99308 SBSQ NF CARE LOW MDM 20: CPT | Performed by: INTERNAL MEDICINE

## 2023-11-04 PROBLEM — N30.00 ACUTE CYSTITIS WITHOUT HEMATURIA: Status: ACTIVE | Noted: 2023-11-04

## 2023-11-04 PROBLEM — F33.9 DEPRESSION, RECURRENT (CMS-HCC): Status: ACTIVE | Noted: 2023-11-04

## 2023-11-04 NOTE — PROGRESS NOTES
Name: Vahid Monahan  YOB: 1945I  Date of service October 29, 2023  Allergies: No Known Allergies   Code Status: CPR    Special Instructions: Skilled services for PT/OT/ST nursing observation and care plan implementation. S/P hosp stay for back, hip pain, past medical hx., OA Lt.hip, Umbilical hernia, atroke, HTN, DM. Monitor for pain, ADL level of participation. Monitor lab work and VS as indicated.   Diet: NPO diet, NPO texture, NPO consistency  Diagnosis: UNILATERAL PRIMARY OSTEOARTHRITIS, LEFT HIP UTI anemia SIADH    HPI evaluated for the weakness fatigue tired confusion constipation urgency frequency UTI and SIADH  Sodium 133 CO2 36 BUN 33 hydration repeat CBC BMP urinalysis because of history of UTI with dehydration  78-year-old patient complaining of the constipation confusion weakness poor appetite tingling numbness back pain leg pain  Anemia with thrombocytopenia check iron B12 folic acid  SIADH sodium 131 chloride 92 BUN 41 creatinine 0.6 protein 5.9 alkaline phosphatase 134 cut down water intake hold nephrotoxic medication      Transfer [facility] to the [facility] admitting diagnosis back pain bilateral sacroiliitis CHF diabetes dysphagia hypertension hyperlipidemia osteomyelitis sacral decubitus ulcer    This is a 78-year-old patient who had a diabetes pancytopenia obesity esophageal stricture s/p dilatation oral candidiasis PEG tube placement brought to the emergency room poor appetite weight loss hyperglycemia blood sugar 400 x-ray showed pneumonia acute renal failure diagnosis of uncontrolled diabetes dehydration aspiration pneumonia weight loss dysphagia PEG tube placement colitis UTI hyponatremia diabetes with complication seen by cardiologist infectious disease endocrinologist gastroenterologist patient was stabilized discharge back to the [facility] azotemia bilateral pulmonary infiltrate chronic hyperglycemia dehydration diabetes oral thrush hypoxia  aspiration pneumonia lumbar osteomyelitis protein calorie malnutrition iron deficiency anemia carpal tunnel CHF hip pain pancytopenia    Patient received IV Diflucan IV Protonix IV iron IV daptomycin and Zosyn PEG tube care Pickard catheter care    REVIEW OF SYSTEMS:  Review of systems are negative except where noted in HPI.    There were no vitals taken for this visit.     Physical Exam   Blood pressure 132/66 heart rate 72 respiratory 18 temperature 98 oxygen saturation 96% not in pain    Cachexia of chronic disease    Skin: Pressure ulcer   eyes: PERRLA, EOMs intact,  Conjunctiva pink with no redness or exudates. Cornea & anterior chamber are clear, Eyelids without lesions. No scleral icterus.     ENT: Minimal wax   neck: Supple, without meningismus. Thyroid not palpable. Trachea at midline. No lymphadenopathy.    Pulmonary: Decreased air entry crackles.  No accessory muscle use or stridor.    Cardiac: Heart murmur ankle edema    Abdomen: Active epigastric tenderness o rebound or guarding.  No CVA tenderness.    Genitourinary: Pickard catheter  Musculoskeletal: F arthritis of spine with tenderness   neurological: Dysarthria dysphagia neuropathy  Psychiatric: Appropriate mood and affect.  Anxiety depression      Living will related issues reviewed-CODE STATUS: Review    DISCHARGE PLANNING:  Discharge Home when goals are met.   Follow up with PCP in 1-2 weeks after discharge from facility.   [facility] to follow after discharge for continued PT/OT and Nursing.    RECENT HOSPITALIZATION DATES: [facility]  All laboratories, diagnostic tests, progress notes, and consultation notes reviewed from recent hospitalization.     LABORATORIES OR DIAGNOSTIC TESTS DONE/REVIEWED IN FACILITY: Review    MEDICATIONS REVIEWED AT THE FACILITY:     Aspirin Low Dose Oral Tablet Chewable 81 MG (Aspirin) Active 9/9/2023     Metoprolol Tartrate Oral Tablet 25 MG (Metoprolol Tartrate) Active 9/8/2023 9/8/2023    Albuterol Sulfate Inhalation  Nebulization Solution (2.5 MG/3ML) 0.083% (Albuterol Sulfate) Active 9/8/2023 9/8/2023    Multivitamin-Minerals Oral Tablet (Multiple Vitamins w/ Minerals) Active 9/9/2023     Insulin Glargine Subcutaneous Solution 100 UNIT/ML (Insulin Glargine) Active 9/9/2023 9/8/2023    Losartan Potassium Oral Tablet 25 MG (Losartan Potassium) Active 9/9/2023 9/8/2023    Escitalopram Oxalate Oral Tablet 20 MG (Escitalopram Oxalate) Active 9/9/2023 9/8/2023    traMADol HCl Oral Tablet 50 MG (Tramadol HCl) Active 9/8/2023 9/8/2023    Docusate Sodium Oral Liquid 100 MG/10ML (Docusate Sodium) Active 9/8/2023     metFORMIN HCl Oral Tablet 500 MG (Metformin HCl) Active 9/8/2023 9/8/2023    Polyethylene Glycol 3350 Oral Powder 17 GM/SCOOP (Polyethylene Glycol 3350) Active 9/8/2023     Rosuvastatin Calcium Oral Tablet 5 MG (Rosuvastatin Calcium) Active 9/8/2023 9/8/2023    Insulin Lispro Injection Solution 100 UNIT/ML (Insulin Lispro) Active 9/9/2023 9/9/2023    Magnesium Oxide Oral Tablet 400 MG (Magnesium Oxide) Active 9/10/2023   Assessment/Plan    Problem List Items Addressed This Visit       Osteomyelitis (CMS/HCC)     Pressure ulcer UTI and osteomyelitis discitis Dr. pantoja infectious disease to follow-up         Type 2 diabetes mellitus with diabetic peripheral angiopathy without gangrene (CMS/HCC)     Diabetes is chronic disease, it does not get cured but it can be controlled, in modern medicine there are variety of measures taken to control DM. Usually we want to preserve beta cell functions. Please understand the disease and how our life style can affect control of glycemia. Diabetes leads to macro and microvascular complications and they could be devastating. It is important to have annual eye check and frequent foot inspection and foot inspection. Kidney dysfunction including dialysis, foot amputations, neuropathy, foot ulcers and accelerated atherosclerotic vascular disease are known complications of uncontrolled DM, pt was  educated and explained.           Hypertension associated with diabetes (CMS/HCC)     Patients BP readings reviewed and addressed, as we age our arteries turn stiffer and less elastic. Restricting salt consumption and staying physically fit with regular exercise regimen is the only way to keep our vasculature less tonic. Studies have shown that keeping ideal body wt, exercise routine about 140 to 150 minutes a week, eating variety of plant based diet and drinking plentiful water are quite helpful. Monitor BP twice or once a week at home and bring log to be reviewed by me. Uncontrolled BP has long term consequences including heart failure, myocardial infarction, accelerated atherosclerosis and kidney dysfunction. Therapy reviewed and explained.           Discitis of lumbosacral region    Moderate protein-calorie malnutrition (CMS/HCC)    SIADH (syndrome of inappropriate ADH production) (CMS/HCC) - Primary     Check serum and urinary sodium cortisol and TSH level restrict fluid intake         Acute cystitis without hematuria    Depression, recurrent (CMS/HCC)   Rx list reviewed. PT and OT evaluation is in the process. Routine safety measures, fall precautions, risk modification and alarm placement if needed for prevention of falls. Skin care precautions, prevention of pressures sores at pressure points assessed. Pt needs to be monitored frequently by nursing staff particularly at night time. Any confusion, agitation or behavioural disturbance needs to be attended, as per home policy rapid covid Ag assay need to be done, notify if positive. If needed appropriate measures to be taken for alarm placements and assisted devices, pt was told not to get up and ambulate at night unless help and assist available at bedside, labs will be done as per our routine protocol. PO intake need to be monitored if consuming po.   Skin Integrity:  Nursing to monitor skin integrity as patient is at risk for pressure injuries.  Wound care  per nursing    See Facility notes for measurements/assessments  Turn and reposition Q 2 hours or more  Air mattress and when up in chair cushion reducing device  Dietician to evaluate and recommend:  Nutritional supplement:  Please monitor skin integrity and other pressure areas  Referral to wound clinic if appropriate:    PLAN:   Recent nursing evaluation and notes were reviewed.   Overall, patient is stable despite his/her chronic conditions.    #  Any decline or change in condition needs to be communicated with the physician or myself.    Discussion with nursing staff regarding ongoing care and management.  If needed, would communicate with family who are not present at this time.   There are no concerns at this time.    We will continue with the medications noted above.    We will continue to follow the patient here at the facility.    *Please note that nursing facility, outside laboratory agency, and  [facility] do not interface.     Completion of the note was done through Dragon voice recognition technology and may include   unintended or grammatical errors which may not have been recognized when finalizing the note.     Time:     Corey Atkinson MD

## 2023-11-06 PROBLEM — R63.4 WEIGHT LOSS OF MORE THAN 10% BODY WEIGHT: Status: ACTIVE | Noted: 2023-11-06

## 2023-11-06 PROBLEM — M19.90 ARTHRITIS: Status: ACTIVE | Noted: 2023-11-06

## 2023-11-06 PROBLEM — M47.817 LUMBOSACRAL SPONDYLOSIS WITHOUT MYELOPATHY: Status: ACTIVE | Noted: 2023-11-06

## 2023-11-06 PROBLEM — I10 HYPERTENSION: Status: ACTIVE | Noted: 2023-11-06

## 2023-11-06 PROBLEM — E44.0 MALNUTRITION OF MODERATE DEGREE (MULTI): Status: ACTIVE | Noted: 2022-03-01

## 2023-11-06 PROBLEM — E11.9 DIABETES MELLITUS (MULTI): Status: ACTIVE | Noted: 2023-11-06

## 2023-11-06 PROBLEM — M67.90 TENDON DISORDER: Status: ACTIVE | Noted: 2018-01-25

## 2023-11-06 PROBLEM — M46.1 BILATERAL SACROILIITIS (CMS-HCC): Status: ACTIVE | Noted: 2023-11-06

## 2023-11-06 PROBLEM — I50.9 CONGESTIVE HEART FAILURE (MULTI): Status: ACTIVE | Noted: 2023-11-06

## 2023-11-06 PROBLEM — M16.9 OSTEOARTHRITIS OF HIP: Status: ACTIVE | Noted: 2023-11-06

## 2023-11-06 PROBLEM — D72.810 LYMPHOCYTOPENIA: Status: ACTIVE | Noted: 2023-10-04

## 2023-11-06 PROBLEM — M96.1 LUMBAR POSTLAMINECTOMY SYNDROME: Status: ACTIVE | Noted: 2023-11-06

## 2023-11-06 PROBLEM — I63.512 ARTERIAL ISCHEMIC STROKE, MCA (MIDDLE CEREBRAL ARTERY), LEFT, ACUTE (MULTI): Status: ACTIVE | Noted: 2022-02-27

## 2023-11-06 PROBLEM — I65.22 CAROTID ARTERY STENOSIS, SYMPTOMATIC, LEFT: Status: ACTIVE | Noted: 2022-03-01

## 2023-11-06 PROBLEM — M54.10 RADICULAR LOW BACK PAIN: Status: ACTIVE | Noted: 2023-11-06

## 2023-11-06 PROBLEM — K46.9 ABDOMINAL HERNIA: Status: ACTIVE | Noted: 2023-11-06

## 2023-11-06 PROBLEM — G56.02 CARPAL TUNNEL SYNDROME ON LEFT: Status: ACTIVE | Noted: 2017-11-30

## 2023-11-06 PROBLEM — M51.36 DEGENERATIVE DISC DISEASE, LUMBAR: Status: ACTIVE | Noted: 2023-11-06

## 2023-11-06 PROBLEM — I63.9 STROKE (MULTI): Status: ACTIVE | Noted: 2023-11-06

## 2023-11-06 PROBLEM — R68.89 TOTAL SELF-CARE DEFICIT: Status: ACTIVE | Noted: 2023-11-06

## 2023-11-06 PROBLEM — M46.20 OSTEOMYELITIS OF SPINE (MULTI): Status: ACTIVE | Noted: 2023-10-04

## 2023-11-06 PROBLEM — M70.61 GREATER TROCHANTERIC BURSITIS OF RIGHT HIP: Status: ACTIVE | Noted: 2023-11-06

## 2023-11-07 RX ORDER — IPRATROPIUM BROMIDE AND ALBUTEROL SULFATE 2.5; .5 MG/3ML; MG/3ML
SOLUTION RESPIRATORY (INHALATION)
COMMUNITY
Start: 2023-08-28

## 2023-11-07 RX ORDER — LIDOCAINE 50 MG/G
1 PATCH TOPICAL DAILY
COMMUNITY
Start: 2023-01-27

## 2023-11-07 RX ORDER — METFORMIN HYDROCHLORIDE 1000 MG/1
1000 TABLET ORAL
COMMUNITY
Start: 2022-12-27

## 2023-11-07 RX ORDER — ESCITALOPRAM OXALATE 10 MG/1
10 TABLET ORAL DAILY
COMMUNITY
Start: 2023-08-24

## 2023-11-07 RX ORDER — METOPROLOL TARTRATE 25 MG/1
25 TABLET, FILM COATED ORAL
COMMUNITY
Start: 2023-10-13

## 2023-11-07 RX ORDER — ONDANSETRON 4 MG/1
4 TABLET, ORALLY DISINTEGRATING ORAL
COMMUNITY
Start: 2023-10-13

## 2023-11-07 RX ORDER — OXYCODONE AND ACETAMINOPHEN 5; 325 MG/1; MG/1
1 TABLET ORAL 3 TIMES DAILY
COMMUNITY
Start: 2023-02-24

## 2023-11-07 RX ORDER — ASPIRIN 325 MG
325 TABLET ORAL
COMMUNITY

## 2023-11-07 RX ORDER — INSULIN GLARGINE-YFGN 100 [IU]/ML
INJECTION, SOLUTION SUBCUTANEOUS
COMMUNITY
Start: 2023-08-27

## 2023-11-29 ENCOUNTER — NURSING HOME VISIT (OUTPATIENT)
Dept: POST ACUTE CARE | Facility: EXTERNAL LOCATION | Age: 78
End: 2023-11-29
Payer: MEDICARE

## 2023-11-29 DIAGNOSIS — E44.0 MODERATE PROTEIN-CALORIE MALNUTRITION (MULTI): ICD-10-CM

## 2023-11-29 DIAGNOSIS — E11.51 TYPE 2 DIABETES MELLITUS WITH DIABETIC PERIPHERAL ANGIOPATHY WITHOUT GANGRENE, UNSPECIFIED WHETHER LONG TERM INSULIN USE (MULTI): ICD-10-CM

## 2023-11-29 DIAGNOSIS — F33.9 DEPRESSION, RECURRENT (CMS-HCC): ICD-10-CM

## 2023-11-29 DIAGNOSIS — M86.9 OSTEOMYELITIS, UNSPECIFIED SITE, UNSPECIFIED TYPE (MULTI): ICD-10-CM

## 2023-11-29 DIAGNOSIS — M46.47 DISCITIS OF LUMBOSACRAL REGION: ICD-10-CM

## 2023-11-29 DIAGNOSIS — E11.51 TYPE 2 DIABETES MELLITUS WITH DIABETIC PERIPHERAL ANGIOPATHY WITHOUT GANGRENE, WITHOUT LONG-TERM CURRENT USE OF INSULIN (MULTI): ICD-10-CM

## 2023-11-29 DIAGNOSIS — I15.2 HYPERTENSION ASSOCIATED WITH DIABETES (MULTI): ICD-10-CM

## 2023-11-29 DIAGNOSIS — N30.00 ACUTE CYSTITIS WITHOUT HEMATURIA: ICD-10-CM

## 2023-11-29 DIAGNOSIS — E22.2 SIADH (SYNDROME OF INAPPROPRIATE ADH PRODUCTION) (MULTI): Primary | ICD-10-CM

## 2023-11-29 DIAGNOSIS — E11.59 HYPERTENSION ASSOCIATED WITH DIABETES (MULTI): ICD-10-CM

## 2023-11-29 NOTE — LETTER
Patient: Vahid Monahan  : 1945    Encounter Date: 2023    Name: Vahid Monahan  YOB: 1945I  Allergies: No Known Allergies   Code Status: CPR    Special Instructions: Skilled services for PT/OT/ST nursing observation and care plan implementation. S/P hosp stay for back, hip pain, past medical hx., OA Lt.hip, Umbilical hernia, atroke, HTN, DM. Monitor for pain, ADL level of participation. Monitor lab work and VS as indicated.   Diet: NPO diet, NPO texture, NPO consistency  Diagnosis: UNILATERAL PRIMARY OSTEOARTHRITIS, LEFT HIP UTI anemia SIADH    HPI evaluated for the weakness fatigue tired confusion constipation urgency frequency UTI and SIADH  Sodium 133 CO2 36 BUN 33 hydration repeat CBC BMP urinalysis because of history of UTI with dehydration  78-year-old patient complaining of the constipation confusion weakness poor appetite tingling numbness back pain leg pain  Anemia with thrombocytopenia check iron B12 folic acid  SIADH sodium 131 chloride 92 BUN 41 creatinine 0.6 protein 5.9 alkaline phosphatase 134 cut down water intake hold nephrotoxic medication      Transfer from [facility] to the [facility] admitting diagnosis back pain bilateral sacroiliitis CHF diabetes dysphagia hypertension hyperlipidemia osteomyelitis sacral decubitus ulcer    This is a 78-year-old patient who had a diabetes pancytopenia obesity esophageal stricture s/p dilatation oral candidiasis PEG tube placement brought to the emergency room poor appetite weight loss hyperglycemia blood sugar 400 x-ray showed pneumonia acute renal failure diagnosis of uncontrolled diabetes dehydration aspiration pneumonia weight loss dysphagia PEG tube placement colitis UTI hyponatremia diabetes with complication seen by cardiologist infectious disease endocrinologist gastroenterologist patient was stabilized discharge back to the [facility] azotemia bilateral pulmonary infiltrate chronic hyperglycemia  dehydration diabetes oral thrush hypoxia aspiration pneumonia lumbar osteomyelitis protein calorie malnutrition iron deficiency anemia carpal tunnel CHF hip pain pancytopenia    Patient received IV Diflucan IV Protonix IV iron IV daptomycin and Zosyn PEG tube care Pickard catheter care    REVIEW OF SYSTEMS:  Review of systems are negative except where noted in HPI.    There were no vitals taken for this visit.     Physical Exam   Blood pressure 132/66 heart rate 72 respiratory 18 temperature 98 oxygen saturation 96% not in pain    Cachexia of chronic disease    Skin: Pressure ulcer   eyes: PERRLA, EOMs intact,  Conjunctiva pink with no redness or exudates. Cornea & anterior chamber are clear, Eyelids without lesions. No scleral icterus.     ENT: Minimal wax   neck: Supple, without meningismus. Thyroid not palpable. Trachea at midline. No lymphadenopathy.    Pulmonary: Decreased air entry crackles.  No accessory muscle use or stridor.    Cardiac: Heart murmur ankle edema    Abdomen: Active epigastric tenderness o rebound or guarding.  No CVA tenderness.    Genitourinary: Pickard catheter  Musculoskeletal: F arthritis of spine with tenderness   neurological: Dysarthria dysphagia neuropathy  Psychiatric: Appropriate mood and affect.  Anxiety depression      Living will related issues reviewed-CODE STATUS: Review    DISCHARGE PLANNING:  Discharge Home when goals are met.   Follow up with PCP in 1-2 weeks after discharge from facility.   [facility] to follow after discharge for continued PT/OT and Nursing.    RECENT HOSPITALIZATION DATES: [facility]  All laboratories, diagnostic tests, progress notes, and consultation notes reviewed from recent hospitalization.     LABORATORIES OR DIAGNOSTIC TESTS DONE/REVIEWED IN FACILITY: Review    MEDICATIONS REVIEWED AT THE FACILITY:     Aspirin Low Dose Oral Tablet Chewable 81 MG (Aspirin) Active 9/9/2023     Metoprolol Tartrate Oral Tablet 25 MG (Metoprolol  Tartrate) Active 9/8/2023 9/8/2023    Albuterol Sulfate Inhalation Nebulization Solution (2.5 MG/3ML) 0.083% (Albuterol Sulfate) Active 9/8/2023 9/8/2023    Multivitamin-Minerals Oral Tablet (Multiple Vitamins w/ Minerals) Active 9/9/2023     Insulin Glargine Subcutaneous Solution 100 UNIT/ML (Insulin Glargine) Active 9/9/2023 9/8/2023    Losartan Potassium Oral Tablet 25 MG (Losartan Potassium) Active 9/9/2023 9/8/2023    Escitalopram Oxalate Oral Tablet 20 MG (Escitalopram Oxalate) Active 9/9/2023 9/8/2023    traMADol HCl Oral Tablet 50 MG (Tramadol HCl) Active 9/8/2023 9/8/2023    Docusate Sodium Oral Liquid 100 MG/10ML (Docusate Sodium) Active 9/8/2023     metFORMIN HCl Oral Tablet 500 MG (Metformin HCl) Active 9/8/2023 9/8/2023    Polyethylene Glycol 3350 Oral Powder 17 GM/SCOOP (Polyethylene Glycol 3350) Active 9/8/2023     Rosuvastatin Calcium Oral Tablet 5 MG (Rosuvastatin Calcium) Active 9/8/2023 9/8/2023    Insulin Lispro Injection Solution 100 UNIT/ML (Insulin Lispro) Active 9/9/2023 9/9/2023    Magnesium Oxide Oral Tablet 400 MG (Magnesium Oxide) Active 9/10/2023   Assessment/Plan   Problem List Items Addressed This Visit       Osteomyelitis (CMS/HCC)     Pressure ulcer UTI and osteomyelitis discitis Dr. pantoja infectious disease to follow-up         Type 2 diabetes mellitus with diabetic peripheral angiopathy without gangrene (CMS/HCC)     Diabetes is chronic disease, it does not get cured but it can be controlled, in modern medicine there are variety of measures taken to control DM. Usually we want to preserve beta cell functions. Please understand the disease and how our life style can affect control of glycemia. Diabetes leads to macro and microvascular complications and they could be devastating. It is important to have annual eye check and frequent foot inspection and foot inspection. Kidney dysfunction including dialysis, foot amputations, neuropathy, foot ulcers and accelerated atherosclerotic  vascular disease are known complications of uncontrolled DM, pt was educated and explained.           Hypertension associated with diabetes (CMS/HCC)     Patients BP readings reviewed and addressed, as we age our arteries turn stiffer and less elastic. Restricting salt consumption and staying physically fit with regular exercise regimen is the only way to keep our vasculature less tonic. Studies have shown that keeping ideal body wt, exercise routine about 140 to 150 minutes a week, eating variety of plant based diet and drinking plentiful water are quite helpful. Monitor BP twice or once a week at home and bring log to be reviewed by me. Uncontrolled BP has long term consequences including heart failure, myocardial infarction, accelerated atherosclerosis and kidney dysfunction. Therapy reviewed and explained.           Discitis of lumbosacral region    Moderate protein-calorie malnutrition (CMS/HCC)    SIADH (syndrome of inappropriate ADH production) (CMS/HCC) - Primary     Check serum and urinary sodium cortisol and TSH level restrict fluid intake         Acute cystitis without hematuria    Depression, recurrent (CMS/HCC)   Rx list reviewed. PT and OT evaluation is in the process. Routine safety measures, fall precautions, risk modification and alarm placement if needed for prevention of falls. Skin care precautions, prevention of pressures sores at pressure points assessed. Pt needs to be monitored frequently by nursing staff particularly at night time. Any confusion, agitation or behavioural disturbance needs to be attended, as per home policy rapid covid Ag assay need to be done, notify if positive. If needed appropriate measures to be taken for alarm placements and assisted devices, pt was told not to get up and ambulate at night unless help and assist available at bedside, labs will be done as per our routine protocol. PO intake need to be monitored if consuming po.   Skin Integrity:  Nursing to monitor skin  integrity as patient is at risk for pressure injuries.  Wound care per nursing    See Facility notes for measurements/assessments  Turn and reposition Q 2 hours or more  Air mattress and when up in chair cushion reducing device  Dietician to evaluate and recommend:  Nutritional supplement:  Please monitor skin integrity and other pressure areas  Referral to wound clinic if appropriate:    PLAN:   Recent nursing evaluation and notes were reviewed.   Overall, patient is stable despite his/her chronic conditions.    #  Any decline or change in condition needs to be communicated with the physician or myself.    Discussion with nursing staff regarding ongoing care and management.  If needed, would communicate with family who are not present at this time.   There are no concerns at this time.    We will continue with the medications noted above.    We will continue to follow the patient here at the facility.    *Please note that nursing facility, outside laboratory agency, and  [facility] do not interface.     Completion of the note was done through Dragon voice recognition technology and may include   unintended or grammatical errors which may not have been recognized when finalizing the note.     Time:     Corey Atkinson MD        Electronically Signed By: Corey Atkinson MD   11/4/23  2:23 PM

## 2024-01-04 ENCOUNTER — APPOINTMENT (OUTPATIENT)
Dept: NEUROSURGERY | Facility: CLINIC | Age: 79
End: 2024-01-04
Payer: MEDICARE

## 2024-01-06 ENCOUNTER — NURSING HOME VISIT (OUTPATIENT)
Dept: POST ACUTE CARE | Facility: EXTERNAL LOCATION | Age: 79
End: 2024-01-06
Payer: MEDICARE

## 2024-01-06 DIAGNOSIS — E44.0 MODERATE PROTEIN-CALORIE MALNUTRITION (MULTI): ICD-10-CM

## 2024-01-06 DIAGNOSIS — E11.51 TYPE 2 DIABETES MELLITUS WITH DIABETIC PERIPHERAL ANGIOPATHY WITHOUT GANGRENE, WITHOUT LONG-TERM CURRENT USE OF INSULIN (MULTI): Primary | ICD-10-CM

## 2024-01-06 DIAGNOSIS — M46.47 DISCITIS OF LUMBOSACRAL REGION: ICD-10-CM

## 2024-01-06 DIAGNOSIS — E11.51 TYPE 2 DIABETES MELLITUS WITH DIABETIC PERIPHERAL ANGIOPATHY WITHOUT GANGRENE, UNSPECIFIED WHETHER LONG TERM INSULIN USE (MULTI): ICD-10-CM

## 2024-01-06 DIAGNOSIS — D61.818 PANCYTOPENIA (MULTI): ICD-10-CM

## 2024-01-06 PROCEDURE — 99309 SBSQ NF CARE MODERATE MDM 30: CPT | Performed by: INTERNAL MEDICINE

## 2024-01-06 NOTE — PROGRESS NOTES
Name: Vahid Monahan  YOB: 1945I  Date of service October 29, 2023  Allergies: No Known Allergies   Code Status: CPR    Special Instructions: Skilled services for PT/OT/ST nursing observation and care plan implementation. S/P hosp stay for back, hip pain, past medical hx., OA Lt.hip, Umbilical hernia, atroke, HTN, DM. Monitor for pain, ADL level of participation. Monitor lab work and VS as indicated.   Diet: NPO diet, NPO texture, NPO consistency  Diagnosis: Discitis/CVA/dysarthria dysphagia pancytopenia/  HPI     78-year-old patient evaluated for back pain hip pain palpitation protein calorie malnutrition advised refer patient to neurosurgery Dr. Baird continue PEG tube feeding and aspiration precaution palpitation go with metoprolol    Diarrhea C. difficile negative    WBC 3.6 RBC 3.3 hemoglobin 9.6 hematocrit 31.4 platelet 109 BUN 45 alkaline phosphatase 132 G OT 49    Transfer from [facility] to the [facility] admitting diagnosis back pain bilateral sacroiliitis CHF diabetes dysphagia hypertension hyperlipidemia osteomyelitis sacral decubitus ulcer    This is a 78-year-old patient who had a diabetes pancytopenia obesity esophageal stricture s/p dilatation oral candidiasis PEG tube placement brought to the emergency room poor appetite weight loss hyperglycemia blood sugar 400 x-ray showed pneumonia acute renal failure diagnosis of uncontrolled diabetes dehydration aspiration pneumonia weight loss dysphagia PEG tube placement colitis UTI hyponatremia diabetes with complication seen by cardiologist infectious disease endocrinologist gastroenterologist patient was stabilized discharge back to the [facility] azotemia bilateral pulmonary infiltrate chronic hyperglycemia dehydration diabetes oral thrush hypoxia aspiration pneumonia lumbar osteomyelitis protein calorie malnutrition iron deficiency anemia carpal tunnel CHF hip pain pancytopenia    Patient received IV Diflucan IV  Protonix IV iron IV daptomycin and Zosyn PEG tube care Pickard catheter care    REVIEW OF SYSTEMS:  Review of systems are negative except where noted in HPI.    There were no vitals taken for this visit.     Physical Exam      Current Vitals   BP: 119/63 mmHg  1/6/2024 10:36  Temp:98.2 °F  1/6/2024 08:41  Pulse:86 bpm  1/6/2024 10:36    Weight:132 Lbs  1/5/2024 03:41  Resp:16 Breaths/min  1/6/2024 08:41  BS:196 mg/dL  1/6/2024 08:36  O2:92 %  1/6/2024 08:41  Pain:0  1/6/2024 08:41    Cachexia of chronic disease    Skin: Pressure ulcer   eyes: Sunken eyeball    ENT: Minimal wax   neck: Supple, without meningismus. Thyroid not palpable. Trachea at midline. No lymphadenopathy.    Pulmonary: Decreased air entry crackles.  No accessory muscle use or stridor.    Cardiac: Heart murmur ankle edema    Abdomen: Active epigastric tenderness o rebound or guarding.  No CVA tenderness.    Genitourinary: Pickard catheter  Musculoskeletal: F arthritis of spine with tenderness   neurological: Dysarthria dysphagia neuropathy  Psychiatric: Appropriate mood and affect.  Anxiety depression      Living will related issues reviewed-CODE STATUS: Review    DISCHARGE PLANNING:  Discharge Home when goals are met.   Follow up with PCP in 1-2 weeks after discharge from facility.   C to follow after discharge for continued PT/OT and Nursing.    RECENT HOSPITALIZATION DATES: [facility]  All laboratories, diagnostic tests, progress notes, and consultation notes reviewed from recent hospitalization.     LABORATORIES OR DIAGNOSTIC TESTS DONE/REVIEWED IN FACILITY: Review    MEDICATIONS REVIEWED AT THE FACILITY:     Aspirin Low Dose Oral Tablet Chewable 81 MG (Aspirin) Active 9/9/2023     Metoprolol Tartrate Oral Tablet 25 MG (Metoprolol Tartrate) Active 9/8/2023 9/8/2023    Albuterol Sulfate Inhalation Nebulization Solution (2.5 MG/3ML) 0.083% (Albuterol Sulfate) Active 9/8/2023 9/8/2023    Multivitamin-Minerals Oral Tablet (Multiple Vitamins  w/ Minerals) Active 9/9/2023     Insulin Glargine Subcutaneous Solution 100 UNIT/ML (Insulin Glargine) Active 9/9/2023 9/8/2023    Losartan Potassium Oral Tablet 25 MG (Losartan Potassium) Active 9/9/2023 9/8/2023    Escitalopram Oxalate Oral Tablet 20 MG (Escitalopram Oxalate) Active 9/9/2023 9/8/2023    traMADol HCl Oral Tablet 50 MG (Tramadol HCl) Active 9/8/2023 9/8/2023    Docusate Sodium Oral Liquid 100 MG/10ML (Docusate Sodium) Active 9/8/2023     metFORMIN HCl Oral Tablet 500 MG (Metformin HCl) Active 9/8/2023 9/8/2023    Polyethylene Glycol 3350 Oral Powder 17 GM/SCOOP (Polyethylene Glycol 3350) Active 9/8/2023     Rosuvastatin Calcium Oral Tablet 5 MG (Rosuvastatin Calcium) Active 9/8/2023 9/8/2023    Insulin Lispro Injection Solution 100 UNIT/ML (Insulin Lispro) Active 9/9/2023 9/9/2023    Magnesium Oxide Oral Tablet 400 MG (Magnesium Oxide) Active 9/10/2023   Assessment/Plan    Diabetes with complication hypertension hyperlipidemia retinopathy neuropathy advised Accu-Chek with coverage continue Lantus metformin check spot microalbuminuria lipid hemoglobin A1c    Palpitation metoprolol watch for bradycardia    Pancytopenia iron B12 folic acid hematology evaluation check iron B12 folic acid D-dimer LDH    Chronic pain with the nausea continue tramadol Zofran    Hyperlipidemia Crestor 5 mg a day    Anxiety depression Lexapro check PHQ CVA continue aspirin    Neurosurgery and surgery on consult for the PEG tube and back pain  Problem List Items Addressed This Visit       Pancytopenia (CMS/HCC)    Type 2 diabetes mellitus with diabetic peripheral angiopathy without gangrene (CMS/HCC)    Discitis of lumbosacral region    Moderate protein-calorie malnutrition (CMS/HCC)    Diabetes mellitus (CMS/HCC) - Primary   Rx list reviewed. PT and OT evaluation is in the process. Routine safety measures, fall precautions, risk modification and alarm placement if needed for prevention of falls. Skin care precautions,  prevention of pressures sores at pressure points assessed. Pt needs to be monitored frequently by nursing staff particularly at night time. Any confusion, agitation or behavioural disturbance needs to be attended, as per home policy rapid covid Ag assay need to be done, notify if positive. If needed appropriate measures to be taken for alarm placements and assisted devices, pt was told not to get up and ambulate at night unless help and assist available at bedside, labs will be done as per our routine protocol. PO intake need to be monitored if consuming po.   Skin Integrity:  Nursing to monitor skin integrity as patient is at risk for pressure injuries.  Wound care per nursing    See Facility notes for measurements/assessments  Turn and reposition Q 2 hours or more  Air mattress and when up in chair cushion reducing device  Dietician to evaluate and recommend:  Nutritional supplement:  Please monitor skin integrity and other pressure areas  Referral to wound clinic if appropriate:    PLAN:   Recent nursing evaluation and notes were reviewed.   Overall, patient is stable despite his/her chronic conditions.    #  Any decline or change in condition needs to be communicated with the physician or myself.    Discussion with nursing staff regarding ongoing care and management.  If needed, would communicate with family who are not present at this time.   There are no concerns at this time.    We will continue with the medications noted above.    We will continue to follow the patient here at the facility.    *Please note that nursing facility, outside laboratory agency, and  [facility] do not interface.     Completion of the note was done through Dragon voice recognition technology and may include   unintended or grammatical errors which may not have been recognized when finalizing the note.     Time:     Corey Atkinson MD

## 2024-01-06 NOTE — LETTER
Patient: Vahid Monahan  : 1945    Encounter Date: 2024    Name: Vahid Monahan  YOB: 1945I  Date of service 2023  Allergies: No Known Allergies   Code Status: CPR    Special Instructions: Skilled services for PT/OT/ST nursing observation and care plan implementation. S/P hosp stay for back, hip pain, past medical hx., OA Lt.hip, Umbilical hernia, atroke, HTN, DM. Monitor for pain, ADL level of participation. Monitor lab work and VS as indicated.   Diet: NPO diet, NPO texture, NPO consistency  Diagnosis: Discitis/CVA/dysarthria dysphagia pancytopenia/  HPI     78-year-old patient evaluated for back pain hip pain palpitation protein calorie malnutrition advised refer patient to neurosurgery Dr. Baird continue PEG tube feeding and aspiration precaution palpitation go with metoprolol    Diarrhea C. difficile negative    WBC 3.6 RBC 3.3 hemoglobin 9.6 hematocrit 31.4 platelet 109 BUN 45 alkaline phosphatase 132 G OT 49    Transfer [facility] to the [facility] admitting diagnosis back pain bilateral sacroiliitis CHF diabetes dysphagia hypertension hyperlipidemia osteomyelitis sacral decubitus ulcer    This is a 78-year-old patient who had a diabetes pancytopenia obesity esophageal stricture s/p dilatation oral candidiasis PEG tube placement brought to the emergency room poor appetite weight loss hyperglycemia blood sugar 400 x-ray showed pneumonia acute renal failure diagnosis of uncontrolled diabetes dehydration aspiration pneumonia weight loss dysphagia PEG tube placement colitis UTI hyponatremia diabetes with complication seen by cardiologist infectious disease endocrinologist gastroenterologist patient was stabilized discharge back to the [facility] azotemia bilateral pulmonary infiltrate chronic hyperglycemia dehydration diabetes oral thrush hypoxia aspiration pneumonia lumbar osteomyelitis protein calorie malnutrition iron deficiency anemia carpal  tunnel CHF hip pain pancytopenia    Patient received IV Diflucan IV Protonix IV iron IV daptomycin and Zosyn PEG tube care Pickard catheter care    REVIEW OF SYSTEMS:  Review of systems are negative except where noted in HPI.    There were no vitals taken for this visit.     Physical Exam      Current Vitals   BP: 119/63 mmHg  1/6/2024 10:36  Temp:98.2 °F  1/6/2024 08:41  Pulse:86 bpm  1/6/2024 10:36    Weight:132 Lbs  1/5/2024 03:41  Resp:16 Breaths/min  1/6/2024 08:41  BS:196 mg/dL  1/6/2024 08:36  O2:92 %  1/6/2024 08:41  Pain:0  1/6/2024 08:41    Cachexia of chronic disease    Skin: Pressure ulcer   eyes: Sunken eyeball    ENT: Minimal wax   neck: Supple, without meningismus. Thyroid not palpable. Trachea at midline. No lymphadenopathy.    Pulmonary: Decreased air entry crackles.  No accessory muscle use or stridor.    Cardiac: Heart murmur ankle edema    Abdomen: Active epigastric tenderness o rebound or guarding.  No CVA tenderness.    Genitourinary: Pickard catheter  Musculoskeletal: F arthritis of spine with tenderness   neurological: Dysarthria dysphagia neuropathy  Psychiatric: Appropriate mood and affect.  Anxiety depression      Living will related issues reviewed-CODE STATUS: Review    DISCHARGE PLANNING:  Discharge Home when goals are met.   Follow up with PCP in 1-2 weeks after discharge from facility.   C to follow after discharge for continued PT/OT and Nursing.    RECENT HOSPITALIZATION DATES: [facility]  All laboratories, diagnostic tests, progress notes, and consultation notes reviewed from recent hospitalization.     LABORATORIES OR DIAGNOSTIC TESTS DONE/REVIEWED IN FACILITY: Review    MEDICATIONS REVIEWED AT THE FACILITY:     Aspirin Low Dose Oral Tablet Chewable 81 MG (Aspirin) Active 9/9/2023     Metoprolol Tartrate Oral Tablet 25 MG (Metoprolol Tartrate) Active 9/8/2023 9/8/2023    Albuterol Sulfate Inhalation Nebulization Solution (2.5 MG/3ML) 0.083% (Albuterol  Sulfate) Active 9/8/2023 9/8/2023    Multivitamin-Minerals Oral Tablet (Multiple Vitamins w/ Minerals) Active 9/9/2023     Insulin Glargine Subcutaneous Solution 100 UNIT/ML (Insulin Glargine) Active 9/9/2023 9/8/2023    Losartan Potassium Oral Tablet 25 MG (Losartan Potassium) Active 9/9/2023 9/8/2023    Escitalopram Oxalate Oral Tablet 20 MG (Escitalopram Oxalate) Active 9/9/2023 9/8/2023    traMADol HCl Oral Tablet 50 MG (Tramadol HCl) Active 9/8/2023 9/8/2023    Docusate Sodium Oral Liquid 100 MG/10ML (Docusate Sodium) Active 9/8/2023     metFORMIN HCl Oral Tablet 500 MG (Metformin HCl) Active 9/8/2023 9/8/2023    Polyethylene Glycol 3350 Oral Powder 17 GM/SCOOP (Polyethylene Glycol 3350) Active 9/8/2023     Rosuvastatin Calcium Oral Tablet 5 MG (Rosuvastatin Calcium) Active 9/8/2023 9/8/2023    Insulin Lispro Injection Solution 100 UNIT/ML (Insulin Lispro) Active 9/9/2023 9/9/2023    Magnesium Oxide Oral Tablet 400 MG (Magnesium Oxide) Active 9/10/2023   Assessment/Plan   Diabetes with complication hypertension hyperlipidemia retinopathy neuropathy advised Accu-Chek with coverage continue Lantus metformin check spot microalbuminuria lipid hemoglobin A1c    Palpitation metoprolol watch for bradycardia    Pancytopenia iron B12 folic acid hematology evaluation check iron B12 folic acid D-dimer LDH    Chronic pain with the nausea continue tramadol Zofran    Hyperlipidemia Crestor 5 mg a day    Anxiety depression Lexapro check PHQ CVA continue aspirin    Neurosurgery and surgery on consult for the PEG tube and back pain  Problem List Items Addressed This Visit       Pancytopenia (CMS/HCC)    Type 2 diabetes mellitus with diabetic peripheral angiopathy without gangrene (CMS/HCC)    Discitis of lumbosacral region    Moderate protein-calorie malnutrition (CMS/HCC)    Diabetes mellitus (CMS/HCC) - Primary   Rx list reviewed. PT and OT evaluation is in the process. Routine safety measures, fall precautions, risk  modification and alarm placement if needed for prevention of falls. Skin care precautions, prevention of pressures sores at pressure points assessed. Pt needs to be monitored frequently by nursing staff particularly at night time. Any confusion, agitation or behavioural disturbance needs to be attended, as per home policy rapid covid Ag assay need to be done, notify if positive. If needed appropriate measures to be taken for alarm placements and assisted devices, pt was told not to get up and ambulate at night unless help and assist available at bedside, labs will be done as per our routine protocol. PO intake need to be monitored if consuming po.   Skin Integrity:  Nursing to monitor skin integrity as patient is at risk for pressure injuries.  Wound care per nursing    See Facility notes for measurements/assessments  Turn and reposition Q 2 hours or more  Air mattress and when up in chair cushion reducing device  Dietician to evaluate and recommend:  Nutritional supplement:  Please monitor skin integrity and other pressure areas  Referral to wound clinic if appropriate:    PLAN:   Recent nursing evaluation and notes were reviewed.   Overall, patient is stable despite his/her chronic conditions.    #  Any decline or change in condition needs to be communicated with the physician or myself.    Discussion with nursing staff regarding ongoing care and management.  If needed, would communicate with family who are not present at this time.   There are no concerns at this time.    We will continue with the medications noted above.    We will continue to follow the patient here at the facility.    *Please note that nursing facility, outside laboratory agency, and [facility] do not interface.     Completion of the note was done through Dragon voice recognition technology and may include   unintended or grammatical errors which may not have been recognized when finalizing the note.     Time:     Corey Atkinson MD         Electronically Signed By: Corey Atkinson MD   1/6/24  1:32 PM

## 2024-01-18 ENCOUNTER — OFFICE VISIT (OUTPATIENT)
Dept: NEUROSURGERY | Facility: CLINIC | Age: 79
End: 2024-01-18
Payer: MEDICARE

## 2024-01-18 VITALS
WEIGHT: 138 LBS | TEMPERATURE: 96.9 F | HEART RATE: 74 BPM | BODY MASS INDEX: 26.95 KG/M2 | SYSTOLIC BLOOD PRESSURE: 110 MMHG | DIASTOLIC BLOOD PRESSURE: 68 MMHG

## 2024-01-18 DIAGNOSIS — M51.36 DEGENERATIVE DISC DISEASE, LUMBAR: Primary | ICD-10-CM

## 2024-01-18 DIAGNOSIS — M54.50 CHRONIC MIDLINE LOW BACK PAIN WITHOUT SCIATICA: ICD-10-CM

## 2024-01-18 DIAGNOSIS — G89.29 CHRONIC MIDLINE LOW BACK PAIN WITHOUT SCIATICA: ICD-10-CM

## 2024-01-18 PROCEDURE — 3078F DIAST BP <80 MM HG: CPT | Performed by: NEUROLOGICAL SURGERY

## 2024-01-18 PROCEDURE — 1126F AMNT PAIN NOTED NONE PRSNT: CPT | Performed by: NEUROLOGICAL SURGERY

## 2024-01-18 PROCEDURE — 1036F TOBACCO NON-USER: CPT | Performed by: NEUROLOGICAL SURGERY

## 2024-01-18 PROCEDURE — 1159F MED LIST DOCD IN RCRD: CPT | Performed by: NEUROLOGICAL SURGERY

## 2024-01-18 PROCEDURE — 3074F SYST BP LT 130 MM HG: CPT | Performed by: NEUROLOGICAL SURGERY

## 2024-01-18 PROCEDURE — 99214 OFFICE O/P EST MOD 30 MIN: CPT | Performed by: NEUROLOGICAL SURGERY

## 2024-01-18 ASSESSMENT — PAIN SCALES - GENERAL: PAINLEVEL: 0-NO PAIN

## 2024-01-18 ASSESSMENT — PATIENT HEALTH QUESTIONNAIRE - PHQ9
SUM OF ALL RESPONSES TO PHQ9 QUESTIONS 1 & 2: 0
1. LITTLE INTEREST OR PLEASURE IN DOING THINGS: NOT AT ALL
2. FEELING DOWN, DEPRESSED OR HOPELESS: NOT AT ALL

## 2024-01-18 NOTE — PROGRESS NOTES
[facility] [place]  Department of Neurological Surgery  Established Patient Visit    History of Present Illness  Vahid Monahan is a 78 y.o. year old male who presents to the spine clinic in follow up with chronic low back pain.  I last saw him in July 2023.  He has a history of prior L3-5 laminectomies in 2017.  He developed L3-4 osteomyelitis discitis in March 2023, for which she was treated with a long course of daptomycin and Zosyn.  He has previously undergone medial branch blocks and sacroiliac joint injections with Dr. Adam Sanchez from pain management.  When I last saw him in July 2023, I had ordered a new MRI since he had not had a study since the prior scan from March 2023.  He is here today to go over the results of that study and for ongoing treatment discussion.  Of note, he was hospitalized at [facility] in September 2023.  He was diagnosed with pneumonia at that time during his hospitalization, he was evaluated by my colleague Dr. Savage from orthopedic spine surgery.  Dr. Savage did not see a clear role for surgery at that time.    Patient's BMI is Body mass index is 26.95 kg/m².    14/14 systems reviewed and negative other than what is listed in the history of present illness    Patient Active Problem List   Diagnosis    Osteomyelitis (CMS/HCC)    Pancytopenia (CMS/HCC)    Type 2 diabetes mellitus with diabetic peripheral angiopathy without gangrene (CMS/HCC)    Hypertension associated with diabetes (CMS/HCC)    Cerebrovascular accident (CVA) (CMS/HCC)    Discitis of lumbosacral region    Acute osteomyelitis of spine (CMS/HCC)    GERD without esophagitis    Pneumonia of right middle lobe due to infectious organism    Moderate protein-calorie malnutrition (CMS/HCC)    Chronic midline low back pain without sciatica    Thrombocytopenia (CMS/HCC)    Anemia of chronic disease    SIADH (syndrome of inappropriate ADH production) (CMS/HCC)    Acute cystitis without  hematuria    Depression, recurrent (CMS/HCC)    Abdominal hernia    Arthritis    Bilateral sacroiliitis (CMS/HCC)    Carotid artery stenosis, symptomatic, left    Carpal tunnel syndrome on left    Congestive heart failure (CMS/[facility])    Degenerative disc disease, lumbar    Diabetes mellitus (CMS/[facility])    Diabetes mellitus type 2, controlled, without complications (CMS/[facility])    Essential hypertension, benign    Greater trochanteric bursitis of right hip    Hyperlipidemia    Lumbar postlaminectomy syndrome    Lumbar canal stenosis    Lumbar radiculopathy    Radicular low back pain    Lumbosacral spondylosis without myelopathy    Osteoarthritis of hip    Pain in joint, pelvic region and thigh    Spondylosis of lumbar spine    Tendon disorder    Total self-care deficit    Weight loss of more than 10% body weight    Arterial ischemic stroke, MCA (middle cerebral artery), left, acute (CMS/[facility])    Stroke (CMS/[facility])    Hypertension    Malnutrition of moderate degree (CMS/[facility])    Osteomyelitis of spine (CMS/[facility])    Lymphocytopenia    Spinal stenosis, site unspecified    Thoracic and lumbosacral neuritis     No past medical history on file.  No past surgical history on file.  Social History     Tobacco Use    Smoking status: Former     Types: Cigarettes    Smokeless tobacco: Never   Substance Use Topics    Alcohol use: Not Currently     family history is not on file.    Current Outpatient Medications:     aspirin 81 mg chewable tablet, Chew 1 tablet (81 mg) 1 time., Disp: , Rfl:     blood sugar diagnostic strip, TEST BLOOD SUGAR TWICE DAILY, Disp: , Rfl:     docusate sodium (Colace) 50 mg/5 mL oral liquid, Take 5 mL (50 mg) by mouth once daily., Disp: , Rfl:     escitalopram (Lexapro) 20 mg tablet, Take 1 tablet (20 mg) by mouth., Disp: , Rfl:     ferrous sulfate (IRON ORAL), GTube, TID, Date: 10/04/2023 14:08:00 EDT, Disp: , Rfl:     folic acid/multivit-min/lutein (CENTRUM SILVER ORAL), Take by mouth once daily., Disp: , Rfl:      insulin glargine (Lantus U-100 Insulin) 100 unit/mL injection, Inject under the skin., Disp: , Rfl:     insulin glargine (Lantus) 100 unit/mL injection, Inject under the skin., Disp: , Rfl:     insulin glargine-yfgn 100 unit/mL (3 mL) Pen, , Disp: , Rfl:     insulin lispro (HumaLOG KwikPen Insulin) 100 unit/mL injection, Inject under the skin., Disp: , Rfl:     insulin lispro (HumaLOG U-100 Insulin) 100 unit/mL injection, Inject under the skin., Disp: , Rfl:     magnesium oxide (Mag-Ox) 400 mg tablet, Take 1 tablet (400 mg) by mouth once daily., Disp: , Rfl:     metFORMIN (Glucophage) 500 mg tablet, Take 1 tablet (500 mg) by mouth., Disp: , Rfl:     metoprolol tartrate (Lopressor) 25 mg tablet, Take 1 tablet (25 mg) by mouth., Disp: , Rfl:     ondansetron ODT (Zofran-ODT) 4 mg disintegrating tablet, Take 1 tablet (4 mg) by mouth., Disp: , Rfl:     polyethylene glycol 3350 (MIRALAX ORAL), Take 17 g by mouth 2 times a day., Disp: , Rfl:     rosuvastatin (Crestor) 5 mg tablet, Take 1 tablet (5 mg) by mouth., Disp: , Rfl:     traMADol (Ultram) 50 mg tablet, Take 1 tablet (50 mg) by mouth., Disp: , Rfl:     aspirin 325 mg tablet, Take 1 tablet (325 mg) by mouth., Disp: , Rfl:     atorvastatin (Lipitor) 80 mg tablet, Take 1 tablet (80 mg) by mouth once daily., Disp: , Rfl:     calcium carbonate 195 mg calcium (500 mg) tablet,chewable, Chew., Disp: , Rfl:     dronabinol (Marinol) 2.5 mg capsule, Take 1 capsule (2.5 mg) by mouth once daily., Disp: , Rfl:     escitalopram (Lexapro) 10 mg tablet, Take 1 tablet (10 mg) by mouth once daily., Disp: , Rfl:     finasteride (Proscar) 5 mg tablet, Take 1 tablet (5 mg) by mouth once daily., Disp: , Rfl:     glimepiride (Amaryl) 2 mg tablet, Take 1 tablet (2 mg) by mouth once daily with breakfast., Disp: , Rfl:     glucosamine sulfate 500 mg tablet, Take 500 mg by mouth., Disp: , Rfl:     ipratropium-albuteroL (Duo-Neb) 0.5-2.5 mg/3 mL nebulizer solution, , Disp: , Rfl:     lidocaine  (Lidoderm) 5 % patch, Place 1 patch on the skin once daily., Disp: , Rfl:     losartan (Cozaar) 25 mg tablet, Take 1 tablet (25 mg) by mouth once daily., Disp: , Rfl:     metFORMIN (Glucophage) 1,000 mg tablet, Take 1 tablet (1,000 mg) by mouth., Disp: , Rfl:     metoprolol tartrate (Lopressor) 50 mg tablet, Take 0.5 tablets by mouth 2 times a day., Disp: , Rfl:     orphenadrine (Norflex) 100 mg 12 hr tablet, Take 1 tablet (100 mg) by mouth., Disp: , Rfl:     oxyCODONE-acetaminophen (Percocet) 5-325 mg tablet, Take 1 tablet by mouth 3 times a day., Disp: , Rfl:     tamsulosin (Flomax) 0.4 mg 24 hr capsule, Take 1 capsule (0.4 mg) by mouth once daily., Disp: , Rfl:   No Known Allergies    Physical Examination:    General: Cachectic  Skin: Warm and dry, no lesions, no rashes  ENMT: Mucous membranes moist, no apparent injury, no lesions seen  Head/Neck: Neck Supple, no apparent injury  Respiratory/Thorax: Normal breath sounds with good chest expansion, thorax symmetric  Cardiovascular: No pitting edema, no JVD    Motor Strength: Generally weak throughout bilateral lower extremities    Muscle Bulk: Normal and symmetric in all extremities    Posture:   -- Cervical: Normal  -- Thoracic: Normal  -- Lumbar : Normal  Paraspinal muscle spasm/tenderness absent    Sensation: intact to light touch      Results:  I personally reviewed and interpreted the imaging results which included an MRI of the lumbar spine performed on August 8, 2023.  This shows advanced multilevel degenerative changes throughout the lumbar spine, including significant loss of disc height at multiple levels.  There are endplate changes, most prominent at L3-4 concerning for discitis/osteomyelitis.  That said, there is no significant central stenosis.  There is moderate foraminal stenosis at L3-4 and L4-5.    Assessment and Plan:      Vahid Monahan is a 78 y.o. year old male who presents to the spine clinic in follow up with chronic low back pain.  He has a  history of prior L3-5 laminectomies.  He was also diagnosed and treated for L3-4 osteomyelitis/discitis in March 2023.  His most recent MRI from August 2023 shows advanced multilevel degenerative changes, as well as expected sequela from osteomyelitis/discitis.  There is no significant central or foraminal stenosis.  I do lengthy discussion with the patient regarding his condition and treatment options.  I told him that I do not see a clear indication for surgery, especially given his poor nutritional status and other medical comorbidities.  He is severely deconditioned.  I recommended that he follow-up with pain management for possible further injections and potentially a spinal cord stimulator.  I have also encouraged him to work with physical therapy on his posture and general conditioning.  He will follow-up on an as-needed basis.      I have reviewed all prior documentation and reviewed the electronic medical record since admission. I have personally have reviewed all advanced imaging not just the reports and used my interpretation as documented as the relevant findings. I have reviewed the risks and benefits of all treatment recommendations listed in this note with the patient and family. I spent a total of 30 minutes in service to this patient's care during this date of service.      The above clinical summary has been dictated with voice recognition software. It has not been proofread for grammatical errors, typographical mistakes, or other semantic inconsistencies.    Thank you for visiting our office today. It was our pleasure to take part in your healthcare.     Do not hesitate to call with any questions regarding your plan of care after leaving at [phone] M-F 8am-4pm.     To clinicians, thank you very much for this kind referral. It is a privilege to partner with you in the care of your patients. My office would be delighted to assist you with any further consultations or with questions regarding  the plan of care outlined. Do not hesitate to call the office or contact me directly.       Sincerely,      Calixto Baird MD PhD  Attending Neurosurgeon  [facility]   of Neurological Surgery  [facility]    [place] [id number] [facility]  [address]  [place] [address]    [facility]  [address]  [address]  [place] [address]    Office: [phone]  Fax: [phone]